# Patient Record
Sex: FEMALE | Race: WHITE | NOT HISPANIC OR LATINO
[De-identification: names, ages, dates, MRNs, and addresses within clinical notes are randomized per-mention and may not be internally consistent; named-entity substitution may affect disease eponyms.]

---

## 2019-06-28 ENCOUNTER — TRANSCRIPTION ENCOUNTER (OUTPATIENT)
Age: 29
End: 2019-06-28

## 2019-06-28 ENCOUNTER — EMERGENCY (EMERGENCY)
Facility: HOSPITAL | Age: 29
LOS: 1 days | End: 2019-06-28
Admitting: EMERGENCY MEDICINE
Payer: COMMERCIAL

## 2019-06-28 PROCEDURE — 73080 X-RAY EXAM OF ELBOW: CPT | Mod: 26,RT

## 2019-06-28 PROCEDURE — 73090 X-RAY EXAM OF FOREARM: CPT | Mod: 26,RT,76

## 2019-06-28 PROCEDURE — 99285 EMERGENCY DEPT VISIT HI MDM: CPT

## 2019-06-29 ENCOUNTER — INPATIENT (INPATIENT)
Facility: HOSPITAL | Age: 29
LOS: 3 days | Discharge: HOME | DRG: 494 | End: 2019-07-03
Attending: SURGERY | Admitting: SURGERY
Payer: COMMERCIAL

## 2019-06-29 VITALS
SYSTOLIC BLOOD PRESSURE: 110 MMHG | DIASTOLIC BLOOD PRESSURE: 76 MMHG | HEART RATE: 95 BPM | TEMPERATURE: 99 F | RESPIRATION RATE: 14 BRPM | OXYGEN SATURATION: 98 %

## 2019-06-29 DIAGNOSIS — S51.859A: ICD-10-CM

## 2019-06-29 DIAGNOSIS — T07.XXXA UNSPECIFIED MULTIPLE INJURIES, INITIAL ENCOUNTER: ICD-10-CM

## 2019-06-29 DIAGNOSIS — S42.494B: ICD-10-CM

## 2019-06-29 DIAGNOSIS — Z98.891 HISTORY OF UTERINE SCAR FROM PREVIOUS SURGERY: Chronic | ICD-10-CM

## 2019-06-29 PROBLEM — Z00.00 ENCOUNTER FOR PREVENTIVE HEALTH EXAMINATION: Status: ACTIVE | Noted: 2019-06-29

## 2019-06-29 LAB
ALBUMIN SERPL ELPH-MCNC: 4.5 G/DL — SIGNIFICANT CHANGE UP (ref 3.3–5.2)
ALP SERPL-CCNC: 60 U/L — SIGNIFICANT CHANGE UP (ref 40–120)
ALT FLD-CCNC: 15 U/L — SIGNIFICANT CHANGE UP
ANION GAP SERPL CALC-SCNC: 12 MMOL/L — SIGNIFICANT CHANGE UP (ref 5–17)
ANION GAP SERPL CALC-SCNC: 15 MMOL/L — SIGNIFICANT CHANGE UP (ref 5–17)
APPEARANCE UR: CLEAR — SIGNIFICANT CHANGE UP
APPEARANCE UR: CLEAR — SIGNIFICANT CHANGE UP
APTT BLD: 27.3 SEC — LOW (ref 27.5–36.3)
AST SERPL-CCNC: 30 U/L — SIGNIFICANT CHANGE UP
BACTERIA # UR AUTO: ABNORMAL
BASOPHILS # BLD AUTO: 0 K/UL — SIGNIFICANT CHANGE UP (ref 0–0.2)
BASOPHILS # BLD AUTO: 0 K/UL — SIGNIFICANT CHANGE UP (ref 0–0.2)
BASOPHILS NFR BLD AUTO: 0 % — SIGNIFICANT CHANGE UP (ref 0–2)
BASOPHILS NFR BLD AUTO: 0 % — SIGNIFICANT CHANGE UP (ref 0–2)
BILIRUB SERPL-MCNC: 0.3 MG/DL — LOW (ref 0.4–2)
BILIRUB UR-MCNC: NEGATIVE — SIGNIFICANT CHANGE UP
BILIRUB UR-MCNC: NEGATIVE — SIGNIFICANT CHANGE UP
BLD GP AB SCN SERPL QL: SIGNIFICANT CHANGE UP
BUN SERPL-MCNC: 16 MG/DL — SIGNIFICANT CHANGE UP (ref 8–20)
BUN SERPL-MCNC: 24 MG/DL — HIGH (ref 8–20)
CALCIUM SERPL-MCNC: 8.9 MG/DL — SIGNIFICANT CHANGE UP (ref 8.6–10.2)
CALCIUM SERPL-MCNC: 9 MG/DL — SIGNIFICANT CHANGE UP (ref 8.6–10.2)
CHLORIDE SERPL-SCNC: 105 MMOL/L — SIGNIFICANT CHANGE UP (ref 98–107)
CHLORIDE SERPL-SCNC: 106 MMOL/L — SIGNIFICANT CHANGE UP (ref 98–107)
CO2 SERPL-SCNC: 18 MMOL/L — LOW (ref 22–29)
CO2 SERPL-SCNC: 20 MMOL/L — LOW (ref 22–29)
COLOR SPEC: YELLOW — SIGNIFICANT CHANGE UP
COLOR SPEC: YELLOW — SIGNIFICANT CHANGE UP
CREAT SERPL-MCNC: 0.68 MG/DL — SIGNIFICANT CHANGE UP (ref 0.5–1.3)
CREAT SERPL-MCNC: 0.95 MG/DL — SIGNIFICANT CHANGE UP (ref 0.5–1.3)
DIFF PNL FLD: ABNORMAL
DIFF PNL FLD: NEGATIVE — SIGNIFICANT CHANGE UP
EOSINOPHIL # BLD AUTO: 0 K/UL — SIGNIFICANT CHANGE UP (ref 0–0.5)
EOSINOPHIL # BLD AUTO: 0.24 K/UL — SIGNIFICANT CHANGE UP (ref 0–0.5)
EOSINOPHIL NFR BLD AUTO: 0 % — SIGNIFICANT CHANGE UP (ref 0–6)
EOSINOPHIL NFR BLD AUTO: 0.9 % — SIGNIFICANT CHANGE UP (ref 0–6)
EPI CELLS # UR: SIGNIFICANT CHANGE UP
EPI CELLS # UR: SIGNIFICANT CHANGE UP
ETHANOL SERPL-MCNC: <10 MG/DL — SIGNIFICANT CHANGE UP
GIANT PLATELETS BLD QL SMEAR: PRESENT — SIGNIFICANT CHANGE UP
GIANT PLATELETS BLD QL SMEAR: PRESENT — SIGNIFICANT CHANGE UP
GLUCOSE SERPL-MCNC: 131 MG/DL — HIGH (ref 70–115)
GLUCOSE SERPL-MCNC: 136 MG/DL — HIGH (ref 70–115)
GLUCOSE UR QL: 50 MG/DL
GLUCOSE UR QL: 50 MG/DL
HCG SERPL-ACNC: <4 MIU/ML — SIGNIFICANT CHANGE UP
HCG UR QL: NEGATIVE — SIGNIFICANT CHANGE UP
HCT VFR BLD CALC: 29.9 % — LOW (ref 34.5–45)
HCT VFR BLD CALC: 36.1 % — SIGNIFICANT CHANGE UP (ref 34.5–45)
HGB BLD-MCNC: 11.3 G/DL — LOW (ref 11.5–15.5)
HGB BLD-MCNC: 9.4 G/DL — LOW (ref 11.5–15.5)
INR BLD: 1.15 RATIO — SIGNIFICANT CHANGE UP (ref 0.88–1.16)
KETONES UR-MCNC: ABNORMAL
KETONES UR-MCNC: NEGATIVE — SIGNIFICANT CHANGE UP
LEUKOCYTE ESTERASE UR-ACNC: NEGATIVE — SIGNIFICANT CHANGE UP
LEUKOCYTE ESTERASE UR-ACNC: NEGATIVE — SIGNIFICANT CHANGE UP
LIDOCAIN IGE QN: 24 U/L — SIGNIFICANT CHANGE UP (ref 22–51)
LYMPHOCYTES # BLD AUTO: 0.48 K/UL — LOW (ref 1–3.3)
LYMPHOCYTES # BLD AUTO: 1.36 K/UL — SIGNIFICANT CHANGE UP (ref 1–3.3)
LYMPHOCYTES # BLD AUTO: 2.6 % — LOW (ref 13–44)
LYMPHOCYTES # BLD AUTO: 5.2 % — LOW (ref 13–44)
MAGNESIUM SERPL-MCNC: 1.7 MG/DL — SIGNIFICANT CHANGE UP (ref 1.6–2.6)
MANUAL SMEAR VERIFICATION: SIGNIFICANT CHANGE UP
MANUAL SMEAR VERIFICATION: SIGNIFICANT CHANGE UP
MCHC RBC-ENTMCNC: 27.3 PG — SIGNIFICANT CHANGE UP (ref 27–34)
MCHC RBC-ENTMCNC: 27.5 PG — SIGNIFICANT CHANGE UP (ref 27–34)
MCHC RBC-ENTMCNC: 31.3 GM/DL — LOW (ref 32–36)
MCHC RBC-ENTMCNC: 31.4 GM/DL — LOW (ref 32–36)
MCV RBC AUTO: 86.9 FL — SIGNIFICANT CHANGE UP (ref 80–100)
MCV RBC AUTO: 87.8 FL — SIGNIFICANT CHANGE UP (ref 80–100)
MONOCYTES # BLD AUTO: 0.68 K/UL — SIGNIFICANT CHANGE UP (ref 0–0.9)
MONOCYTES # BLD AUTO: 1.3 K/UL — HIGH (ref 0–0.9)
MONOCYTES NFR BLD AUTO: 2.6 % — SIGNIFICANT CHANGE UP (ref 2–14)
MONOCYTES NFR BLD AUTO: 7 % — SIGNIFICANT CHANGE UP (ref 2–14)
MYELOCYTES NFR BLD: 0.9 % — HIGH (ref 0–0)
NEUTROPHILS # BLD AUTO: 16.75 K/UL — HIGH (ref 1.8–7.4)
NEUTROPHILS # BLD AUTO: 23.66 K/UL — HIGH (ref 1.8–7.4)
NEUTROPHILS NFR BLD AUTO: 88.7 % — HIGH (ref 43–77)
NEUTROPHILS NFR BLD AUTO: 90.4 % — HIGH (ref 43–77)
NEUTS BAND # BLD: 1.7 % — SIGNIFICANT CHANGE UP (ref 0–8)
NITRITE UR-MCNC: NEGATIVE — SIGNIFICANT CHANGE UP
NITRITE UR-MCNC: NEGATIVE — SIGNIFICANT CHANGE UP
PH UR: 6 — SIGNIFICANT CHANGE UP (ref 5–8)
PH UR: 7 — SIGNIFICANT CHANGE UP (ref 5–8)
PHOSPHATE SERPL-MCNC: 3 MG/DL — SIGNIFICANT CHANGE UP (ref 2.4–4.7)
PLAT MORPH BLD: NORMAL — SIGNIFICANT CHANGE UP
PLAT MORPH BLD: NORMAL — SIGNIFICANT CHANGE UP
PLATELET # BLD AUTO: 178 K/UL — SIGNIFICANT CHANGE UP (ref 150–400)
PLATELET # BLD AUTO: 258 K/UL — SIGNIFICANT CHANGE UP (ref 150–400)
PLATELET COUNT - ESTIMATE: ABNORMAL
POTASSIUM SERPL-MCNC: 3.7 MMOL/L — SIGNIFICANT CHANGE UP (ref 3.5–5.3)
POTASSIUM SERPL-MCNC: 3.7 MMOL/L — SIGNIFICANT CHANGE UP (ref 3.5–5.3)
POTASSIUM SERPL-SCNC: 3.7 MMOL/L — SIGNIFICANT CHANGE UP (ref 3.5–5.3)
POTASSIUM SERPL-SCNC: 3.7 MMOL/L — SIGNIFICANT CHANGE UP (ref 3.5–5.3)
PROT SERPL-MCNC: 7.7 G/DL — SIGNIFICANT CHANGE UP (ref 6.6–8.7)
PROT UR-MCNC: 15 MG/DL
PROT UR-MCNC: NEGATIVE MG/DL — SIGNIFICANT CHANGE UP
PROTHROM AB SERPL-ACNC: 13.3 SEC — HIGH (ref 10–12.9)
RBC # BLD: 3.44 M/UL — LOW (ref 3.8–5.2)
RBC # BLD: 4.11 M/UL — SIGNIFICANT CHANGE UP (ref 3.8–5.2)
RBC # FLD: 14.3 % — SIGNIFICANT CHANGE UP (ref 10.3–14.5)
RBC # FLD: 14.4 % — SIGNIFICANT CHANGE UP (ref 10.3–14.5)
RBC BLD AUTO: NORMAL — SIGNIFICANT CHANGE UP
RBC BLD AUTO: NORMAL — SIGNIFICANT CHANGE UP
RBC CASTS # UR COMP ASSIST: ABNORMAL /HPF (ref 0–4)
RBC CASTS # UR COMP ASSIST: SIGNIFICANT CHANGE UP /HPF (ref 0–4)
SODIUM SERPL-SCNC: 137 MMOL/L — SIGNIFICANT CHANGE UP (ref 135–145)
SODIUM SERPL-SCNC: 139 MMOL/L — SIGNIFICANT CHANGE UP (ref 135–145)
SP GR SPEC: 1.01 — SIGNIFICANT CHANGE UP (ref 1.01–1.02)
SP GR SPEC: 1.01 — SIGNIFICANT CHANGE UP (ref 1.01–1.02)
TYPE + AB SCN PNL BLD: SIGNIFICANT CHANGE UP
UROBILINOGEN FLD QL: NEGATIVE MG/DL — SIGNIFICANT CHANGE UP
UROBILINOGEN FLD QL: NEGATIVE MG/DL — SIGNIFICANT CHANGE UP
WBC # BLD: 18.53 K/UL — HIGH (ref 3.8–10.5)
WBC # BLD: 26.17 K/UL — HIGH (ref 3.8–10.5)
WBC # FLD AUTO: 18.53 K/UL — HIGH (ref 3.8–10.5)
WBC # FLD AUTO: 26.17 K/UL — HIGH (ref 3.8–10.5)
WBC UR QL: SIGNIFICANT CHANGE UP
WBC UR QL: SIGNIFICANT CHANGE UP

## 2019-06-29 PROCEDURE — 24000 ARTHRT ELBW EXPL DRG/RMVL FB: CPT | Mod: AS,RT

## 2019-06-29 PROCEDURE — 71045 X-RAY EXAM CHEST 1 VIEW: CPT | Mod: 26

## 2019-06-29 PROCEDURE — 73060 X-RAY EXAM OF HUMERUS: CPT | Mod: 26,RT

## 2019-06-29 PROCEDURE — 73130 X-RAY EXAM OF HAND: CPT | Mod: 26,RT

## 2019-06-29 PROCEDURE — 24000 ARTHRT ELBW EXPL DRG/RMVL FB: CPT | Mod: RT

## 2019-06-29 PROCEDURE — 73206 CT ANGIO UPR EXTRM W/O&W/DYE: CPT | Mod: 26,RT,76

## 2019-06-29 PROCEDURE — 73080 X-RAY EXAM OF ELBOW: CPT | Mod: 26,RT

## 2019-06-29 PROCEDURE — 11010 DEBRIDE SKIN AT FX SITE: CPT | Mod: 59

## 2019-06-29 PROCEDURE — 73090 X-RAY EXAM OF FOREARM: CPT | Mod: 26,LT

## 2019-06-29 PROCEDURE — 99222 1ST HOSP IP/OBS MODERATE 55: CPT

## 2019-06-29 PROCEDURE — 99285 EMERGENCY DEPT VISIT HI MDM: CPT

## 2019-06-29 RX ORDER — AMPICILLIN SODIUM AND SULBACTAM SODIUM 250; 125 MG/ML; MG/ML
3 INJECTION, POWDER, FOR SUSPENSION INTRAMUSCULAR; INTRAVENOUS EVERY 6 HOURS
Refills: 0 | Status: DISCONTINUED | OUTPATIENT
Start: 2019-06-29 | End: 2019-07-03

## 2019-06-29 RX ORDER — SODIUM CHLORIDE 9 MG/ML
1000 INJECTION, SOLUTION INTRAVENOUS ONCE
Refills: 0 | Status: DISCONTINUED | OUTPATIENT
Start: 2019-06-29 | End: 2019-06-29

## 2019-06-29 RX ORDER — HYDROMORPHONE HYDROCHLORIDE 2 MG/ML
0.5 INJECTION INTRAMUSCULAR; INTRAVENOUS; SUBCUTANEOUS
Refills: 0 | Status: DISCONTINUED | OUTPATIENT
Start: 2019-06-29 | End: 2019-06-29

## 2019-06-29 RX ORDER — HYDROMORPHONE HYDROCHLORIDE 2 MG/ML
0.5 INJECTION INTRAMUSCULAR; INTRAVENOUS; SUBCUTANEOUS EVERY 4 HOURS
Refills: 0 | Status: DISCONTINUED | OUTPATIENT
Start: 2019-06-29 | End: 2019-06-30

## 2019-06-29 RX ORDER — SODIUM CHLORIDE 9 MG/ML
1000 INJECTION, SOLUTION INTRAVENOUS
Refills: 0 | Status: DISCONTINUED | OUTPATIENT
Start: 2019-06-29 | End: 2019-06-29

## 2019-06-29 RX ORDER — AMPICILLIN SODIUM AND SULBACTAM SODIUM 250; 125 MG/ML; MG/ML
INJECTION, POWDER, FOR SUSPENSION INTRAMUSCULAR; INTRAVENOUS
Refills: 0 | Status: DISCONTINUED | OUTPATIENT
Start: 2019-06-29 | End: 2019-07-03

## 2019-06-29 RX ORDER — ONDANSETRON 8 MG/1
4 TABLET, FILM COATED ORAL ONCE
Refills: 0 | Status: DISCONTINUED | OUTPATIENT
Start: 2019-06-29 | End: 2019-06-29

## 2019-06-29 RX ORDER — FENTANYL CITRATE 50 UG/ML
50 INJECTION INTRAVENOUS ONCE
Refills: 0 | Status: DISCONTINUED | OUTPATIENT
Start: 2019-06-29 | End: 2019-06-29

## 2019-06-29 RX ORDER — AMPICILLIN SODIUM AND SULBACTAM SODIUM 250; 125 MG/ML; MG/ML
3 INJECTION, POWDER, FOR SUSPENSION INTRAMUSCULAR; INTRAVENOUS ONCE
Refills: 0 | Status: COMPLETED | OUTPATIENT
Start: 2019-06-29 | End: 2019-06-29

## 2019-06-29 RX ORDER — ACETAMINOPHEN 500 MG
650 TABLET ORAL EVERY 6 HOURS
Refills: 0 | Status: DISCONTINUED | OUTPATIENT
Start: 2019-06-29 | End: 2019-07-03

## 2019-06-29 RX ORDER — SODIUM CHLORIDE 9 MG/ML
1000 INJECTION, SOLUTION INTRAVENOUS
Refills: 0 | Status: DISCONTINUED | OUTPATIENT
Start: 2019-06-29 | End: 2019-06-30

## 2019-06-29 RX ORDER — HYDROMORPHONE HYDROCHLORIDE 2 MG/ML
1 INJECTION INTRAMUSCULAR; INTRAVENOUS; SUBCUTANEOUS EVERY 4 HOURS
Refills: 0 | Status: DISCONTINUED | OUTPATIENT
Start: 2019-06-29 | End: 2019-06-30

## 2019-06-29 RX ORDER — POTASSIUM CHLORIDE 20 MEQ
10 PACKET (EA) ORAL
Refills: 0 | Status: COMPLETED | OUTPATIENT
Start: 2019-06-29 | End: 2019-06-29

## 2019-06-29 RX ORDER — MAGNESIUM SULFATE 500 MG/ML
2 VIAL (ML) INJECTION ONCE
Refills: 0 | Status: COMPLETED | OUTPATIENT
Start: 2019-06-29 | End: 2019-06-29

## 2019-06-29 RX ADMIN — Medication 50 GRAM(S): at 10:55

## 2019-06-29 RX ADMIN — Medication 1 MILLIGRAM(S): at 02:06

## 2019-06-29 RX ADMIN — HYDROMORPHONE HYDROCHLORIDE 1 MILLIGRAM(S): 2 INJECTION INTRAMUSCULAR; INTRAVENOUS; SUBCUTANEOUS at 02:06

## 2019-06-29 RX ADMIN — HYDROMORPHONE HYDROCHLORIDE 0.5 MILLIGRAM(S): 2 INJECTION INTRAMUSCULAR; INTRAVENOUS; SUBCUTANEOUS at 15:42

## 2019-06-29 RX ADMIN — AMPICILLIN SODIUM AND SULBACTAM SODIUM 200 GRAM(S): 250; 125 INJECTION, POWDER, FOR SUSPENSION INTRAMUSCULAR; INTRAVENOUS at 13:27

## 2019-06-29 RX ADMIN — Medication 650 MILLIGRAM(S): at 21:40

## 2019-06-29 RX ADMIN — AMPICILLIN SODIUM AND SULBACTAM SODIUM 200 GRAM(S): 250; 125 INJECTION, POWDER, FOR SUSPENSION INTRAMUSCULAR; INTRAVENOUS at 17:36

## 2019-06-29 RX ADMIN — Medication 100 MILLIEQUIVALENT(S): at 11:32

## 2019-06-29 RX ADMIN — Medication 650 MILLIGRAM(S): at 15:22

## 2019-06-29 RX ADMIN — HYDROMORPHONE HYDROCHLORIDE 0.5 MILLIGRAM(S): 2 INJECTION INTRAMUSCULAR; INTRAVENOUS; SUBCUTANEOUS at 16:12

## 2019-06-29 RX ADMIN — Medication 100 MILLIEQUIVALENT(S): at 15:52

## 2019-06-29 RX ADMIN — Medication 650 MILLIGRAM(S): at 20:52

## 2019-06-29 RX ADMIN — AMPICILLIN SODIUM AND SULBACTAM SODIUM 200 GRAM(S): 250; 125 INJECTION, POWDER, FOR SUSPENSION INTRAMUSCULAR; INTRAVENOUS at 04:10

## 2019-06-29 RX ADMIN — SODIUM CHLORIDE 100 MILLILITER(S): 9 INJECTION, SOLUTION INTRAVENOUS at 17:36

## 2019-06-29 RX ADMIN — Medication 650 MILLIGRAM(S): at 14:52

## 2019-06-29 RX ADMIN — Medication 100 MILLIEQUIVALENT(S): at 14:50

## 2019-06-29 RX ADMIN — FENTANYL CITRATE 50 MICROGRAM(S): 50 INJECTION INTRAVENOUS at 00:40

## 2019-06-29 NOTE — H&P ADULT - ATTENDING COMMENTS
Agree with above assessment.  The patient was seen and examined.  The patient was seen and examined by me. The patient was attacked and bitten several times to the left and right arm by a pit bull that was going to attack her son.  The patient states that the right arm took the brunt of the attack and hurts more than the left. She denies LOC, chest or abdominal pain.  HEENT NC/AT PERRL EOMI no raccoon eyes, no zepeda signs, trachea midline, no gross tenderness, no JVD, chest B/L air entry, abdomen is soft, non tender, no guarding, no rebound, pelvis is without crepitus and non tender, the right and left upper extremities are with multiple puncture wounds, there is a larger laceration noted at the lateral aspect of the right elbow.  Distal pulses are grossly intact by all four extremities. X-ray reveals air in the right elbow joint with a distal humerus fracture. Patient is to be admitted to the trauma service, ortho consult for fracture and wound management.  Pain control, IV antibiotics, trauma cleared for OR for ortho intervention.

## 2019-06-29 NOTE — PROGRESS NOTE ADULT - SUBJECTIVE AND OBJECTIVE BOX
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

## 2019-06-29 NOTE — ED PROVIDER NOTE - SKIN, MLM
3 cm laceration to distal RUE. multiple scattered circumferential abrasions to GREG upper extremities

## 2019-06-29 NOTE — H&P ADULT - ASSESSMENT
30yo F s/p bite wound from dog with possible distal humeral and proximal radial fractures on right side.  No reported LUE fractures.  B/L arm lacerations, with Right deeper, larger and worse than left.  Pt tachycardic, H/H WNL, no acute blood loss, other vitals stable, afebrile, Primary intact.  R distal median nerve lesion suspected.    -admit to trauma under Dr. Irwin  -f/u CTA upper extremities  -f/u imaging of RUE  -ortho on board for washout of wounds  -NPO/IVF and preop for surgery with ortho in AM  -f/u [facility] imaging upload  -pain control and anxiety control  -f/u after washout with tertiary and final reads

## 2019-06-29 NOTE — ED PROVIDER NOTE - MUSCULOSKELETAL, MLM
RUE in sling, RUE tender to palpation, no midline tenderness to palpation, able to move GREG lower extremities spontaneously

## 2019-06-29 NOTE — H&P ADULT - HISTORY OF PRESENT ILLNESS
30yo F s/p dog bite on b/l RUE [place] [facility] [place].  Pt saw dog running after her son.  Pt pushed son away and took the bite from the dog.  Dog kept shaking aggressively when attacking patient on the right arm.  Pt also was attacked on the L arm.  Pt's friend came and kicked the dog away from patient.  At [facility] [place], pt received tetanus, zosyn, and percocet.  Wound was not washed out.  Pt reportedly had open fracture in RUE, but no final read was reported.  CD available.  No sob/cp/lightheadedness/dizziness.  Does have right hand weakness and sensory loss. 30yo F s/p dog bite on b/l RUE transfer from [facility] [place], trauma B activation.  Pt saw dog running after her son.  Pt pushed son away and took the bite from the dog.  Dog kept shaking aggressively when attacking patient on the right arm.  Pt also was attacked on the L arm.  Pt's friend came and kicked the dog away from patient.  At [facility] [place], pt received tetanus, zosyn, and percocet.  Wound was not washed out.  Pt reportedly had open fracture in RUE, but no final read was reported.  CD available.  No sob/cp/lightheadedness/dizziness.  Does have right hand weakness and sensory loss.  B/L RUE swelling.      Primary Survey:  A: Protected, patient conversating  B: CTAB. Symmetrical chest rise  C: 2+ central (femoral) & peripheral pulses (Radial, DP)  D: GCS 15, MAEO, interacting. No andrew disability noted  E: B/L RUE circumferential lacerations proximal to elbows, with R worse than L in depth of lacerations; right knee abrasion    Vitals:  Temp: 99  HR: 142 BP: 130/74 RR: 18  SpO2: 99%RA    CXR: Negative for evidence of hemo/pneumothorax 30yo F s/p dog bite on b/l RUE transfer from [facility] [place], trauma B activation.  Pt saw dog running after her son.  Pt pushed son away and took the bite from the dog.  Dog kept shaking aggressively when attacking patient on the right arm.  Pt also was attacked on the L arm.  Pt's friend came and kicked the dog away from patient.  At [facility] [place], pt received tetanus, zosyn, and percocet.  Wound was not washed out.  Pt reportedly had open fracture in RUE, but no final read was reported.  CD available.  No sob/cp/lightheadedness/dizziness.  Does have right hand weakness and sensory loss.  B/L RUE swelling.  Dog reportedly up to date on shots.    Primary Survey:  A: Protected, patient conversating  B: CTAB. Symmetrical chest rise  C: 2+ central (femoral) & peripheral pulses (Radial, DP)  D: GCS 15, MAEO, interacting. No andrew disability noted  E: B/L RUE circumferential lacerations proximal to elbows, with R worse than L in depth of lacerations; right knee abrasion    Vitals:  Temp: 99  HR: 142 BP: 130/74 RR: 18  SpO2: 99%RA    CXR: Negative for evidence of hemo/pneumothorax

## 2019-06-29 NOTE — ED PROVIDER NOTE - CARE PLAN
Principal Discharge DX:	Dog bite of forearm, unspecified laterality, initial encounter  Secondary Diagnosis:	Open fracture of forearm

## 2019-06-29 NOTE — H&P ADULT - NSHPPHYSICALEXAM_GEN_ALL_CORE
Constitutional: Well-developed well nourished Female in no acute distress  HEENT: Head is normocephalic and atraumatic, maxillofacial structures stable, no blood or discharge from nares or oral cavity, no zepeda sign / racoon eyes, EOMI b/l, pupils 2 mm round and reactive to light b/l, no active drainage or redness  Neck: cervical collar in place, trachea midline  Respiratory: Breath sounds CTA b/l respirations are unlabored, no accessory muscle use, no conversational dyspnea  Cardiovascular: Regular rate & rhythm, +S1, S2, Chest wall is non-tender to palpation, no subQ emphysema or crepitus palpated  Gastrointestinal: Abdomen soft, non-tender, non-distended, no rebound tenderness / guarding, no ecchymosis or external signs of abdominal trauma  Musculoskeletal: moving b/l LEs and LUE spontaneously, RUE tenderness and pain with medial three fingers weakness, and sensory loss, consistent with median nerve distribution of lesion; LUE mild tenderness at bite marks with swelling; comparments soft b/l UE's; 7 circumferential lacerations on distal part of right upper brachium, averaging about 4cm in length; 6 circumferential lacerations on distal part of left upper brachium, less deep than right with 1 cm length lacerations.  Pelvis: stable  Vascular: 2+ radial, femoral, and DP pulses b/l  Neurological: GCS: 15 (4/5/6). A&O x 3; no gross sensory / motor / coordination deficits  Musculoskeletal: 5/5 strength of lower extremities b/l; 0/5 strength on medial three fingers of right hand, able to move wrist, RUE 4/5 strength throughout, due to pain  Neuropsinal: no C/T/LS spine tenderness to palpation, no step-offs or signs of external trauma to the back

## 2019-06-29 NOTE — CONSULT NOTE ADULT - SUBJECTIVE AND OBJECTIVE BOX
Pt Name: MARIA DOLORES GILL    MRN: [id number]      Patient is a 29 year old right hand dominant Female presenting to [facility] ED status post dog bite on bilateral upper extremities [place] [facility] [place]. Patient states she saw her dog running after her son and feared he was going to be bitten, so she pushed her son out of the way. Subsequently the dog latched on to her right arm causing multiple deep puncture wounds near her elbow. Patient was also bitten on left forearm. Patients boyfriend intervened to break the patient free from the dog. Patient received tetanus and zosyn at [facility] [place]. No washout of wound was performed. CT and plain films performed in ED revealed right distal humerus fracture with questionable proximal radius fracture with air in elbow joint. Patient reports difficulty moving her right hand/fingers with associated sensory loss to 4th and 5th fingers. Moderate pain in left forearm- no associated fractures evident on CT or plain films performed at [facility] [place]. No other orthopedic complaints.       REVIEW OF SYSTEMS    General: Alert, responsive, in NAD    Respiratory and Thorax: No difficulty breathing. No cough.    Gastrointestinal:	 No abdominal pain. No diarrhea.     Musculoskeletal: SEE HPI.    Endocrine: No Hx of diabetes.    ROS is otherwise negative.    PAST MEDICAL & SURGICAL HISTORY:  PAST MEDICAL & SURGICAL HISTORY:  No pertinent past medical history  H/O  section      Allergies: No Known Allergies    Medications: acetaminophen   Tablet .. 650 milliGRAM(s) Oral every 6 hours  ampicillin/sulbactam  IVPB      ampicillin/sulbactam  IVPB 3 Gram(s) IV Intermittent once  ampicillin/sulbactam  IVPB 3 Gram(s) IV Intermittent every 6 hours  HYDROmorphone  Injectable 1 milliGRAM(s) IV Push every 4 hours PRN  HYDROmorphone  Injectable 0.5 milliGRAM(s) IV Push every 4 hours PRN  lactated ringers. 1000 milliLiter(s) IV Continuous <Continuous>  LORazepam     Tablet 1 milliGRAM(s) Oral once      FAMILY HISTORY:  : non-contributory    Social History: Patient works as a  full time.     Ambulation: Walking independently                           11.3   26.17 )-----------( 258      ( 2019 00:45 )             36.1       -    139  |  106  |  24.0<H>  ----------------------------<  131<H>  3.7   |  18.0<L>  |  0.95    Ca    9.0      2019 00:45    TPro  7.7  /  Alb  4.5  /  TBili  0.3<L>  /  DBili  x   /  AST  30  /  ALT  15  /  AlkPhos  60  06-      Vital Signs Last 24 Hrs  T(C): 37 (2019 03:01), Max: 37 (2019 03:01)  T(F): 98.6 (2019 03:01), Max: 98.6 (2019 03:01)  HR: 95 (2019 03:01) (95 - 95)  BP: 110/76 (2019 03:01) (110/76 - 110/76)  BP(mean): --  RR: 14 (2019 03:) (14 - 14)  SpO2: 98% (:) (98% - 98%)    Daily     Daily       PHYSICAL EXAM:      Appearance: Alert, responsive, in obvious discomfort, lying in ED stretcher.     Neurological: Sensation is grossly intact to light touch. 5/5 motor function of all extremities. No focal deficits or weaknesses found.    Skin: no rash on visible skin. Skin is clean, dry and intact. No bleeding. No abrasions. No ulcerations.    Vascular: 2+ distal pulses. Cap refill < 2 sec. No signs of venous insufficiency or stasis. No extremity ulcerations. No cyanosis.    Musculoskeletal:         Left Upper Extremity: Numerous circumferential puncture/bite marks circumferentially on distal aspect of left upper brachium (more superficial than those on right extremity. Mild TTP throughout forearm. Sensation intact in median/ulnar/radial distribution. 5/5 EE, 5/5 WF/WE. +finger flexion/extension digits 1-5. Radial pulse 2+. BCR.        Right Upper Extremity: Numerous puncture/bite marks circumferentially, ranging in length from 1-6cm, with the Largest wound on distal part of right upper brachium proximal to elbow with subcutaneous fat visible. RUE tenderness and swelling noted. Compartments soft throughout. Sensation diminished to medial three fingers. Sensation to 4th and 5th fingers intact.  4/5 WF/WE. Not able to flex/extend medial three digits. 0/5 strength of medial three fingers. Radial pulse 2+. Capillary refill is less than 2 seconds.        Left Lower Extremity: Normal painless range of motion. No bony tenderness. No crepitus.        Right Lower Extremity: Small superficial abrasion over knee noted. Normal painless range of motion. No bony tenderness. No crepitus.       Imaging Studies:      < from: CT Angio Upper Extremity w/ IV Cont, Bilateral (19 @ 01:17) >     EXAM:  CT ANGIO UPR EXT (W)AW IC BI                          PROCEDURE DATE:  2019          INTERPRETATION:  CLINICAL INFORMATION: TRAUMA ALERT. Status post dog bite.    TECHNIQUE: CT angiogram of bilateral upper extremities were performed   with coronal and sagittal reformats. 88 cc of Optiray 350 was   administered and 12 cc was discarded. Axial MIP reformats were   additionally performed.    COMPARISON: No similar prior studies are available for comparison.    FINDINGS:    Bone: An acute, comminuted fracture of the medial epicondyle of the right   elbow is seen with mild displacement of multiple fracture fragments. No   additional fracture or dislocation is demonstrated.    Vascular: The right subclavian, axillary, brachial, radial and ulnar   arteries are patent. The left subclavian and axillary arteries are   patent. Venous contamination limits evaluation of the remainder of the   left upper extremity arteries.    Soft tissues: Multiple skin defects are seen in the right elbow   corresponding to lacerations. Moderate subcutaneous inflammatory change   is seen in the right elbow more so posteriorly extending into the upper   arm and forearm which is posttraumatic in nature versus a cellulitis.   Multiple foci of subcutaneous gas is noted in the right elbow with   extension into the right upper arm and forearm. Mild circumferential   subcutaneous inflammatory change is seen in the left forearm which is   post traumatic in nature versus a cellulitis. Associated scattered foci   of subcutaneous gas is noted in the left forearm likely introduced via a   laceration. No rim-enhancing subcutaneous fluid collection is seen to   suggest an abscess.    IMPRESSION:    1. Acute, comminuted and displaced fracture of the medialepicondyle of   the right elbow.  2. Moderate subcutaneous inflammatory change in the right elbow, more so   posteriorly, extending into the upper arm and forearm which is   posttraumatic in nature versus a cellulitis. Associated foci of   subcutaneous gas, more so in the right elbow, likely introduced via the   lacerations. No associated rim-enhancing fluid collection to suggest an   abscess.  3. Mild circumferential subcutaneous inflammatory change in the left   forearm which is post traumatic in nature versus a cellulitis. Associated   foci of subcutaneous gas likely introduced via a laceration. No   associated rim-enhancing fluid collection to suggest an abscess.    < end of copied text >    A/P:  Pt is a  29y Female s/o multiple dog bites of Right/Left upper extremity with associated open comminuted medial epicondyle fracture of right elbow as described above    PLAN:   -Case discussed with Dr. Baires  -Wounds of RUE and LUE copiously washed out with betadine and NS  -Posterior splint applied to RUE  -NPO/IVF and preop for OR at 730 today for washout of Right elbow  -Pain control  -NWB of RUE  -Continue rest of care as per primary team    SPLINTING   PROCEDURE NOTE: Splinting    Performed by: Naga Parry PA-C     Indication: open Right distal humerus fracture     The right upper extremity was appropriately positioned. A plaster splint was applied. Distally, sensation to the medial three fingers was diminished-unchanged from prior to splint being applied. The patient tolerated the procedure well. Pt Name: MARIA DOLORES GILL    MRN: [id number]      Patient is a 29 year old right hand dominant Female presenting to [facility] ED status post dog bite on bilateral upper extremities transferred from [facility] [place]. Patient states she saw her dog running after her son and feared he was going to be bitten, so she pushed her son out of the way. Subsequently the dog latched on to her right arm causing multiple deep puncture wounds near her elbow. Patient was also bitten on left forearm. Patients boyfriend intervened to break the patient free from the dog. Patient received tetanus and zosyn at [facility] [place]. No washout of wound was performed. CT and plain films performed in ED revealed right distal humerus fracture with questionable proximal radius fracture with air in elbow joint. Patient reports difficulty moving her right hand/fingers with associated sensory loss to 4th and 5th fingers. Moderate pain in left forearm- no associated fractures evident on CT or plain films performed at [facility] [place]. No other orthopedic complaints.       REVIEW OF SYSTEMS    General: Alert, responsive, in NAD    Respiratory and Thorax: No difficulty breathing. No cough.    Gastrointestinal:	 No abdominal pain. No diarrhea.     Musculoskeletal: SEE HPI.    Endocrine: No Hx of diabetes.    ROS is otherwise negative.    PAST MEDICAL & SURGICAL HISTORY:  PAST MEDICAL & SURGICAL HISTORY:  No pertinent past medical history  H/O  section      Allergies: No Known Allergies    Medications: acetaminophen   Tablet .. 650 milliGRAM(s) Oral every 6 hours  ampicillin/sulbactam  IVPB      ampicillin/sulbactam  IVPB 3 Gram(s) IV Intermittent once  ampicillin/sulbactam  IVPB 3 Gram(s) IV Intermittent every 6 hours  HYDROmorphone  Injectable 1 milliGRAM(s) IV Push every 4 hours PRN  HYDROmorphone  Injectable 0.5 milliGRAM(s) IV Push every 4 hours PRN  lactated ringers. 1000 milliLiter(s) IV Continuous <Continuous>  LORazepam     Tablet 1 milliGRAM(s) Oral once      FAMILY HISTORY:  : non-contributory    Social History: Patient works as a  full time.     Ambulation: Walking independently                           11.3   26.17 )-----------( 258      ( 2019 00:45 )             36.1       -    139  |  106  |  24.0<H>  ----------------------------<  131<H>  3.7   |  18.0<L>  |  0.95    Ca    9.0      2019 00:45    TPro  7.7  /  Alb  4.5  /  TBili  0.3<L>  /  DBili  x   /  AST  30  /  ALT  15  /  AlkPhos  60  06-      Vital Signs Last 24 Hrs  T(C): 37 (2019 03:01), Max: 37 (2019 03:01)  T(F): 98.6 (2019 03:01), Max: 98.6 (2019 03:01)  HR: 95 (2019 03:01) (95 - 95)  BP: 110/76 (2019 03:01) (110/76 - 110/76)  BP(mean): --  RR: 14 (2019 03:) (14 - 14)  SpO2: 98% (:) (98% - 98%)    Daily     Daily       PHYSICAL EXAM:      Appearance: Alert, responsive, in obvious discomfort, lying in ED stretcher.     Neurological: Sensation is grossly intact to light touch. 5/5 motor function of all extremities. No focal deficits or weaknesses found.    Skin: no rash on visible skin. Skin is clean, dry and intact. No bleeding. No abrasions. No ulcerations.    Vascular: 2+ distal pulses. Cap refill < 2 sec. No signs of venous insufficiency or stasis. No extremity ulcerations. No cyanosis.    Musculoskeletal:         Left Upper Extremity: Numerous circumferential puncture/bite marks circumferentially on distal aspect of left upper brachium (more superficial than those on right extremity. Mild TTP throughout forearm. Sensation intact in median/ulnar/radial distribution. 5/5 EE, 5/5 WF/WE. +finger flexion/extension digits 1-5. Radial pulse 2+. BCR.        Right Upper Extremity: Numerous puncture/bite marks circumferentially, ranging in length from 1-6cm, with the Largest wound on distal part of right upper brachium proximal to elbow with subcutaneous fat visible. RUE tenderness and swelling noted. Compartments soft throughout. Sensation diminished to digtits 1-3 fingers. Sensation to 4th and 5th fingers intact.  4/5 WF/WE. Not able to flex/extend digits 1-3 digits. 0/5 strength of digits 1-3 fingers. Radial pulse 2+. Capillary refill is less than 2 seconds.        Left Lower Extremity: Normal painless range of motion. No bony tenderness. No crepitus.        Right Lower Extremity: Small superficial abrasion over knee noted. Normal painless range of motion. No bony tenderness. No crepitus.       Imaging Studies:      < from: CT Angio Upper Extremity w/ IV Cont, Bilateral (19 @ 01:17) >     EXAM:  CT ANGIO UPR EXT (W)AW IC BI                          PROCEDURE DATE:  2019          INTERPRETATION:  CLINICAL INFORMATION: TRAUMA ALERT. Status post dog bite.    TECHNIQUE: CT angiogram of bilateral upper extremities were performed   with coronal and sagittal reformats. 88 cc of Optiray 350 was   administered and 12 cc was discarded. Axial MIP reformats were   additionally performed.    COMPARISON: No similar prior studies are available for comparison.    FINDINGS:    Bone: An acute, comminuted fracture of the medial epicondyle of the right   elbow is seen with mild displacement of multiple fracture fragments. No   additional fracture or dislocation is demonstrated.    Vascular: The right subclavian, axillary, brachial, radial and ulnar   arteries are patent. The left subclavian and axillary arteries are   patent. Venous contamination limits evaluation of the remainder of the   left upper extremity arteries.    Soft tissues: Multiple skin defects are seen in the right elbow   corresponding to lacerations. Moderate subcutaneous inflammatory change   is seen in the right elbow more so posteriorly extending into the upper   arm and forearm which is posttraumatic in nature versus a cellulitis.   Multiple foci of subcutaneous gas is noted in the right elbow with   extension into the right upper arm and forearm. Mild circumferential   subcutaneous inflammatory change is seen in the left forearm which is   post traumatic in nature versus a cellulitis. Associated scattered foci   of subcutaneous gas is noted in the left forearm likely introduced via a   laceration. No rim-enhancing subcutaneous fluid collection is seen to   suggest an abscess.    IMPRESSION:    1. Acute, comminuted and displaced fracture of the medialepicondyle of   the right elbow.  2. Moderate subcutaneous inflammatory change in the right elbow, more so   posteriorly, extending into the upper arm and forearm which is   posttraumatic in nature versus a cellulitis. Associated foci of   subcutaneous gas, more so in the right elbow, likely introduced via the   lacerations. No associated rim-enhancing fluid collection to suggest an   abscess.  3. Mild circumferential subcutaneous inflammatory change in the left   forearm which is post traumatic in nature versus a cellulitis. Associated   foci of subcutaneous gas likely introduced via a laceration. No   associated rim-enhancing fluid collection to suggest an abscess.    < end of copied text >    A/P:  Pt is a  29y Female s/o multiple dog bites of Right/Left upper extremity with associated open comminuted medial epicondyle fracture of right elbow as described above    PLAN:   -Case discussed with Dr. Baires  -Wounds of RUE and LUE copiously washed out with betadine and NS  -Posterior splint applied to RUE  -NPO/IVF and preop for OR at 730 today for washout of Right elbow  -Pain control  -NWB of RUE  -Continue rest of care as per primary team    SPLINTING   PROCEDURE NOTE: Splinting    Performed by: Naga Parry PA-C     Indication: open Right distal humerus fracture     The right upper extremity was appropriately positioned. A plaster splint was applied. Distally, sensation to the digits 1-3 was diminished-unchanged from prior to splint being applied. The patient tolerated the procedure well. Pt Name: MARIA DOLORES GILL    MRN: [id number]      Patient is a 29 year old right hand dominant Female presenting to [facility] ED status post dog bite on bilateral upper extremities [place] from [facility] [place]. Patient states she saw her dog running after her son and feared he was going to be bitten, so she pushed her son out of the way. Subsequently the dog latched on to her right arm causing multiple deep puncture wounds near her elbow. Patient was also bitten on left forearm. Patients boyfriend intervened to break the patient free from the dog. Patient received tetanus and zosyn at [facility] [place]. No washout of wound was performed. CT and plain films performed in ED revealed right distal humerus fracture with questionable proximal radius fracture with air in elbow joint. Patient reports difficulty moving her right hand/fingers with associated sensory loss to 1-3 fingers. Moderate pain in left forearm- no associated fractures evident on CT or plain films performed at [facility] [place]. No other orthopedic complaints.       REVIEW OF SYSTEMS    General: Alert, responsive, in NAD    Respiratory and Thorax: No difficulty breathing. No cough.    Gastrointestinal:	 No abdominal pain. No diarrhea.     Musculoskeletal: SEE HPI.    Endocrine: No Hx of diabetes.    ROS is otherwise negative.    PAST MEDICAL & SURGICAL HISTORY:  PAST MEDICAL & SURGICAL HISTORY:  No pertinent past medical history  H/O  section      Allergies: No Known Allergies    Medications: acetaminophen   Tablet .. 650 milliGRAM(s) Oral every 6 hours  ampicillin/sulbactam  IVPB      ampicillin/sulbactam  IVPB 3 Gram(s) IV Intermittent once  ampicillin/sulbactam  IVPB 3 Gram(s) IV Intermittent every 6 hours  HYDROmorphone  Injectable 1 milliGRAM(s) IV Push every 4 hours PRN  HYDROmorphone  Injectable 0.5 milliGRAM(s) IV Push every 4 hours PRN  lactated ringers. 1000 milliLiter(s) IV Continuous <Continuous>  LORazepam     Tablet 1 milliGRAM(s) Oral once      FAMILY HISTORY:  : non-contributory    Social History: Patient works as a  full time.     Ambulation: Walking independently                           11.3   26.17 )-----------( 258      ( 2019 00:45 )             36.1       -    139  |  106  |  24.0<H>  ----------------------------<  131<H>  3.7   |  18.0<L>  |  0.95    Ca    9.0      2019 00:45    TPro  7.7  /  Alb  4.5  /  TBili  0.3<L>  /  DBili  x   /  AST  30  /  ALT  15  /  AlkPhos  60  06-      Vital Signs Last 24 Hrs  T(C): 37 (2019 03:01), Max: 37 (2019 03:01)  T(F): 98.6 (2019 03:01), Max: 98.6 (2019 03:01)  HR: 95 (2019 03:01) (95 - 95)  BP: 110/76 (2019 03:01) (110/76 - 110/76)  BP(mean): --  RR: 14 (2019 03:) (14 - 14)  SpO2: 98% (:) (98% - 98%)    Daily     Daily       PHYSICAL EXAM:      Appearance: Alert, responsive, in obvious discomfort, lying in ED stretcher.     Neurological: Sensation is grossly intact to light touch. 5/5 motor function of all extremities. No focal deficits or weaknesses found.    Skin: no rash on visible skin. Skin is clean, dry and intact. No bleeding. No abrasions. No ulcerations.    Vascular: 2+ distal pulses. Cap refill < 2 sec. No signs of venous insufficiency or stasis. No extremity ulcerations. No cyanosis.    Musculoskeletal:         Left Upper Extremity: Numerous circumferential puncture/bite marks circumferentially on distal aspect of left upper brachium (more superficial than those on right extremity. Mild TTP throughout forearm. Sensation intact in median/ulnar/radial distribution. 5/5 EE, 5/5 WF/WE. +finger flexion/extension digits 1-5. Radial pulse 2+. BCR.        Right Upper Extremity: Numerous puncture/bite marks circumferentially, ranging in length from 1-6cm, with the Largest wound on distal part of right upper brachium proximal to elbow with subcutaneous fat visible. RUE tenderness and swelling noted. Compartments soft throughout. Sensation diminished to digtits 1-3 fingers. Sensation to 4th and 5th fingers intact.  4/5 WF/WE. Not able to flex/extend digits 1-3 digits. 0/5 strength of digits 1-3 fingers. Radial pulse 2+. Capillary refill is less than 2 seconds.        Left Lower Extremity: Normal painless range of motion. No bony tenderness. No crepitus.        Right Lower Extremity: Small superficial abrasion over knee noted. Normal painless range of motion. No bony tenderness. No crepitus.       Imaging Studies:      < from: CT Angio Upper Extremity w/ IV Cont, Bilateral (19 @ 01:17) >     EXAM:  CT ANGIO UPR EXT (W)AW IC BI                          PROCEDURE DATE:  2019          INTERPRETATION:  CLINICAL INFORMATION: TRAUMA ALERT. Status post dog bite.    TECHNIQUE: CT angiogram of bilateral upper extremities were performed   with coronal and sagittal reformats. 88 cc of Optiray 350 was   administered and 12 cc was discarded. Axial MIP reformats were   additionally performed.    COMPARISON: No similar prior studies are available for comparison.    FINDINGS:    Bone: An acute, comminuted fracture of the medial epicondyle of the right   elbow is seen with mild displacement of multiple fracture fragments. No   additional fracture or dislocation is demonstrated.    Vascular: The right subclavian, axillary, brachial, radial and ulnar   arteries are patent. The left subclavian and axillary arteries are   patent. Venous contamination limits evaluation of the remainder of the   left upper extremity arteries.    Soft tissues: Multiple skin defects are seen in the right elbow   corresponding to lacerations. Moderate subcutaneous inflammatory change   is seen in the right elbow more so posteriorly extending into the upper   arm and forearm which is posttraumatic in nature versus a cellulitis.   Multiple foci of subcutaneous gas is noted in the right elbow with   extension into the right upper arm and forearm. Mild circumferential   subcutaneous inflammatory change is seen in the left forearm which is   post traumatic in nature versus a cellulitis. Associated scattered foci   of subcutaneous gas is noted in the left forearm likely introduced via a   laceration. No rim-enhancing subcutaneous fluid collection is seen to   suggest an abscess.    IMPRESSION:    1. Acute, comminuted and displaced fracture of the medialepicondyle of   the right elbow.  2. Moderate subcutaneous inflammatory change in the right elbow, more so   posteriorly, extending into the upper arm and forearm which is   posttraumatic in nature versus a cellulitis. Associated foci of   subcutaneous gas, more so in the right elbow, likely introduced via the   lacerations. No associated rim-enhancing fluid collection to suggest an   abscess.  3. Mild circumferential subcutaneous inflammatory change in the left   forearm which is post traumatic in nature versus a cellulitis. Associated   foci of subcutaneous gas likely introduced via a laceration. No   associated rim-enhancing fluid collection to suggest an abscess.    < end of copied text >    A/P:  Pt is a  29y Female s/o multiple dog bites of Right/Left upper extremity with associated open comminuted medial epicondyle fracture of right elbow as described above    PLAN:   -Case discussed with Dr. Baires  -Wounds of RUE and LUE copiously washed out with betadine and NS  -Posterior splint applied to RUE  -NPO/IVF and preop for OR at 730 today for washout of Right elbow  -Pain control  -NWB of RUE  -Continue rest of care as per primary team    SPLINTING   PROCEDURE NOTE: Splinting    Performed by: Naga Parry PA-C     Indication: open Right distal humerus fracture     The right upper extremity was appropriately positioned. A plaster splint was applied. Distally, sensation to the digits 1-3 was diminished-unchanged from prior to splint being applied. The patient tolerated the procedure well. Pt Name: MARIA DOLORES GILL    MRN: [id number]      Patient is a 29 year old right hand dominant Female presenting to [facility] ED status post dog bite on bilateral upper extremities [place] [facility] [place] Patient states she saw her dog running after her son and feared he was going to be bitten, so she pushed her son out of the way. Subsequently the dog latched on to her right arm causing multiple deep puncture wounds near her elbow. Patient was also bitten on left forearm. Patient's boyfriend intervened to break the patient free from the dog. Patient received tetanus [place] [facility] washout of wound was performed. CT and plain films performed in ED revealed right distal humerus fracture with questionable proximal radius fracture with air in elbow joint. Patient reports difficulty moving her right hand/fingers with associated sensory loss to thumb, index, and middle fingers. Moderate pain in left forearm- no associated fractures evident on CT or plain [place] [facility] No other orthopedic complaints.       REVIEW OF SYSTEMS    General: Alert, responsive, in NAD    Respiratory and Thorax: No difficulty breathing. No cough.    Gastrointestinal:	 No abdominal pain. No diarrhea.     Musculoskeletal: SEE HPI.    Endocrine: No Hx of diabetes.    ROS is otherwise negative.    PAST MEDICAL & SURGICAL HISTORY:  PAST MEDICAL & SURGICAL HISTORY:  No pertinent past medical history  H/O  section      Allergies: No Known Allergies    Medications: acetaminophen   Tablet .. 650 milliGRAM(s) Oral every 6 hours  ampicillin/sulbactam  IVPB      ampicillin/sulbactam  IVPB 3 Gram(s) IV Intermittent once  ampicillin/sulbactam  IVPB 3 Gram(s) IV Intermittent every 6 hours  HYDROmorphone  Injectable 1 milliGRAM(s) IV Push every 4 hours PRN  HYDROmorphone  Injectable 0.5 milliGRAM(s) IV Push every 4 hours PRN  lactated ringers. 1000 milliLiter(s) IV Continuous <Continuous>  LORazepam     Tablet 1 milliGRAM(s) Oral once      FAMILY HISTORY:  : non-contributory    Social History: Patient works as a  full time.     Ambulation: Walking independently                           11.3   26.17 )-----------( 258      ( 2019 00:45 )             36.1       06-    139  |  106  |  24.0<H>  ----------------------------<  131<H>  3.7   |  18.0<L>  |  0.95    Ca    9.0      2019 00:45    TPro  7.7  /  Alb  4.5  /  TBili  0.3<L>  /  DBili  x   /  AST  30  /  ALT  15  /  AlkPhos  60  06      Vital Signs Last 24 Hrs  T(C): 37 (2019 03:01), Max: 37 (2019 03:01)  T(F): 98.6 (2019 03:01), Max: 98.6 (2019 03:01)  HR: 95 (2019 03:) (95 - 95)  BP: 110/76 (:01) (110/76 - 110/76)  BP(mean): --  RR: 14 (2019 03:01) (14 - 14)  SpO2: 98% (2019 03:) (98% - 98%)    Daily     Daily       PHYSICAL EXAM:      Appearance: Alert, responsive, in obvious discomfort, lying in ED stretcher.     Neurological: Sensation is grossly intact to light touch. 5/5 motor function of all extremities. No focal deficits or weaknesses found.    Skin: no rash on visible skin. Skin is clean, dry and intact. No bleeding. No abrasions. No ulcerations.    Vascular: 2+ distal pulses. Cap refill < 2 sec. No signs of venous insufficiency or stasis. No extremity ulcerations. No cyanosis.    Musculoskeletal:         Left Upper Extremity: Numerous circumferential puncture/bite marks circumferentially on distal aspect of left upper brachium (more superficial than those on right extremity. Mild TTP throughout forearm. Sensation intact in median/ulnar/radial distribution. 5/5 EE, 5/5 WF/WE. +finger flexion/extension digits 1-5. Radial pulse 2+. BCR.        Right Upper Extremity: Numerous puncture/bite marks circumferentially, ranging in length from 1-6cm, with the Largest wound on distal part of right upper brachium proximal to elbow with subcutaneous fat visible. RUE tenderness and swelling noted. Compartments soft throughout. Sensation diminished to digtits 1-3 fingers. Sensation to 4th and 5th fingers intact.  4/5 WF/WE. Not able to flex/extend digits 1-3 digits. 0/5 strength of digits 1-3 fingers. Radial pulse 2+. Capillary refill is less than 2 seconds.        Left Lower Extremity: Normal painless range of motion. No bony tenderness. No crepitus.        Right Lower Extremity: Small superficial abrasion over knee noted. Normal painless range of motion. No bony tenderness. No crepitus.       Imaging Studies:      < from: CT Angio Upper Extremity w/ IV Cont, Bilateral (19 @ 01:17) >     EXAM:  CT ANGIO UPR EXT (W)AW IC BI                          PROCEDURE DATE:  2019          INTERPRETATION:  CLINICAL INFORMATION: TRAUMA ALERT. Status post dog bite.    TECHNIQUE: CT angiogram of bilateral upper extremities were performed   with coronal and sagittal reformats. 88 cc of Optiray 350 was   administered and 12 cc was discarded. Axial MIP reformats were   additionally performed.    COMPARISON: No similar prior studies are available for comparison.    FINDINGS:    Bone: An acute, comminuted fracture of the medial epicondyle of the right   elbow is seen with mild displacement of multiple fracture fragments. No   additional fracture or dislocation is demonstrated.    Vascular: The right subclavian, axillary, brachial, radial and ulnar   arteries are patent. The left subclavian and axillary arteries are   patent. Venous contamination limits evaluation of the remainder of the   left upper extremity arteries.    Soft tissues: Multiple skin defects are seen in the right elbow   corresponding to lacerations. Moderate subcutaneous inflammatory change   is seen in the right elbow more so posteriorly extending into the upper   arm and forearm which is posttraumatic in nature versus a cellulitis.   Multiple foci of subcutaneous gas is noted in the right elbow with   extension into the right upper arm and forearm. Mild circumferential   subcutaneous inflammatory change is seen in the left forearm which is   post traumatic in nature versus a cellulitis. Associated scattered foci   of subcutaneous gas is noted in the left forearm likely introduced via a   laceration. No rim-enhancing subcutaneous fluid collection is seen to   suggest an abscess.    IMPRESSION:    1. Acute, comminuted and displaced fracture of the medialepicondyle of   the right elbow.  2. Moderate subcutaneous inflammatory change in the right elbow, more so   posteriorly, extending into the upper arm and forearm which is   posttraumatic in nature versus a cellulitis. Associated foci of   subcutaneous gas, more so in the right elbow, likely introduced via the   lacerations. No associated rim-enhancing fluid collection to suggest an   abscess.  3. Mild circumferential subcutaneous inflammatory change in the left   forearm which is post traumatic in nature versus a cellulitis. Associated   foci of subcutaneous gas likely introduced via a laceration. No   associated rim-enhancing fluid collection to suggest an abscess.    < end of copied text >    A/P:  Pt is a  29y Female s/o multiple dog bites of Right/Left upper extremity with associated open comminuted medial epicondyle fracture of right elbow as described above    PLAN:   -Case discussed with Dr. Baires  -Wounds of RUE and LUE copiously washed out with betadine and NS  -Posterior splint applied to RUE  -NPO/IVF and preop for OR at 730 today for washout of Right elbow  -Pain control  -NWB of RUE  -Continue rest of care as per primary team    SPLINTING   PROCEDURE NOTE: Splinting    Performed by: Naga Parry PA-C     Indication: open Right distal humerus fracture     The right upper extremity was appropriately positioned. A plaster splint was applied. Distally, sensation to the digits 1-3 was diminished-unchanged from prior to splint being applied. The patient tolerated the procedure well. Pt Name: MARIA DOLORES GILL    MRN: [id number]      Patient is a 29 year old right hand dominant Female presenting to [facility] ED status post dog bite on bilateral upper extremities transferred from [facility] [place]. Patient states she saw her dog running after her son and feared he was going to be bitten, so she pushed her son out of the way. Subsequently the dog latched on to her right arm causing multiple deep puncture wounds near her elbow. Patient was also bitten on left forearm. Patient's boyfriend intervened to break the patient free from the dog. Patient received tetanus and zosyn at [facility] [place]. No washout of wound was performed. CT and plain films performed in ED revealed right distal humerus fracture with questionable proximal radius fracture with air in elbow joint. Patient reports difficulty moving her right hand/fingers with associated sensory loss to thumb, index, and middle fingers and ulnar two digits. Moderate pain in left forearm- no associated fractures evident on CT or plain films performed at [facility] [place]. No other orthopedic complaints.       REVIEW OF SYSTEMS    General: Alert, responsive, in NAD    Respiratory and Thorax: No difficulty breathing. No cough.    Gastrointestinal:	 No abdominal pain. No diarrhea.     Musculoskeletal: SEE HPI.    Endocrine: No Hx of diabetes.    ROS is otherwise negative.    PAST MEDICAL & SURGICAL HISTORY:  PAST MEDICAL & SURGICAL HISTORY:  No pertinent past medical history  H/O  section      Allergies: No Known Allergies    Medications: acetaminophen   Tablet .. 650 milliGRAM(s) Oral every 6 hours  ampicillin/sulbactam  IVPB      ampicillin/sulbactam  IVPB 3 Gram(s) IV Intermittent once  ampicillin/sulbactam  IVPB 3 Gram(s) IV Intermittent every 6 hours  HYDROmorphone  Injectable 1 milliGRAM(s) IV Push every 4 hours PRN  HYDROmorphone  Injectable 0.5 milliGRAM(s) IV Push every 4 hours PRN  lactated ringers. 1000 milliLiter(s) IV Continuous <Continuous>  LORazepam     Tablet 1 milliGRAM(s) Oral once      FAMILY HISTORY:  : non-contributory    Social History: Patient works as a  full time.     Ambulation: Walking independently                           11.3   26.17 )-----------( 258      ( 2019 00:45 )             36.1       06-    139  |  106  |  24.0<H>  ----------------------------<  131<H>  3.7   |  18.0<L>  |  0.95    Ca    9.0      2019 00:45    TPro  7.7  /  Alb  4.5  /  TBili  0.3<L>  /  DBili  x   /  AST  30  /  ALT  15  /  AlkPhos  60  06-      Vital Signs Last 24 Hrs  T(C): 37 (2019 03:01), Max: 37 (2019 03:01)  T(F): 98.6 (2019 03:01), Max: 98.6 (2019 03:01)  HR: 95 (2019 03:) (95 - 95)  BP: 110/76 (:01) (110/76 - 110/76)  BP(mean): --  RR: 14 (2019 03:01) (14 - 14)  SpO2: 98% (2019 03:) (98% - 98%)    Daily     Daily       PHYSICAL EXAM:      Appearance: Alert, responsive, in obvious discomfort, lying in ED stretcher.     Neurological: Sensation is grossly intact to light touch. 5/5 motor function of all extremities. No focal deficits or weaknesses found.    Skin: no rash on visible skin. Skin is clean, dry and intact. No bleeding. No abrasions. No ulcerations.    Vascular: 2+ distal pulses. Cap refill < 2 sec. No signs of venous insufficiency or stasis. No extremity ulcerations. No cyanosis.    Musculoskeletal:         Left Upper Extremity: Numerous circumferential puncture/bite marks circumferentially on distal aspect of left upper brachium (more superficial than those on right extremity. Mild TTP throughout forearm. Sensation intact in median/ulnar/radial distribution. 5/5 EE, 5/5 WF/WE. +finger flexion/extension digits 1-5. Radial pulse 2+. BCR.        Right Upper Extremity: Numerous puncture/bite marks circumferentially, ranging in length from 1-6cm, with the Largest wound on distal part of right upper brachium proximal to elbow with subcutaneous fat visible. RUE tenderness and swelling noted. Compartments soft throughout. Sensation diminished to dorsal aspect digtits 1-3 fingers. Sensation to 4th and 5th fingers diminished diffusly.  4/5 WF/  1/5WE. Not able to flex/extend digits 1-5 digits. . Radial pulse 2+. Capillary refill is less than 2 seconds.        Left Lower Extremity: Normal painless range of motion. No bony tenderness. No crepitus.        Right Lower Extremity: Small superficial abrasion over knee noted. Normal painless range of motion. No bony tenderness. No crepitus.       Imaging Studies:      < from: CT Angio Upper Extremity w/ IV Cont, Bilateral (19 @ 01:17) >     EXAM:  CT ANGIO UPR EXT (W)AW IC BI                          PROCEDURE DATE:  2019          INTERPRETATION:  CLINICAL INFORMATION: TRAUMA ALERT. Status post dog bite.    TECHNIQUE: CT angiogram of bilateral upper extremities were performed   with coronal and sagittal reformats. 88 cc of Optiray 350 was   administered and 12 cc was discarded. Axial MIP reformats were   additionally performed.    COMPARISON: No similar prior studies are available for comparison.    FINDINGS:    Bone: An acute, comminuted fracture of the medial epicondyle of the right   elbow is seen with mild displacement of multiple fracture fragments. No   additional fracture or dislocation is demonstrated.    Vascular: The right subclavian, axillary, brachial, radial and ulnar   arteries are patent. The left subclavian and axillary arteries are   patent. Venous contamination limits evaluation of the remainder of the   left upper extremity arteries.    Soft tissues: Multiple skin defects are seen in the right elbow   corresponding to lacerations. Moderate subcutaneous inflammatory change   is seen in the right elbow more so posteriorly extending into the upper   arm and forearm which is posttraumatic in nature versus a cellulitis.   Multiple foci of subcutaneous gas is noted in the right elbow with   extension into the right upper arm and forearm. Mild circumferential   subcutaneous inflammatory change is seen in the left forearm which is   post traumatic in nature versus a cellulitis. Associated scattered foci   of subcutaneous gas is noted in the left forearm likely introduced via a   laceration. No rim-enhancing subcutaneous fluid collection is seen to   suggest an abscess.    IMPRESSION:    1. Acute, comminuted and displaced fracture of the medialepicondyle of   the right elbow.  2. Moderate subcutaneous inflammatory change in the right elbow, more so   posteriorly, extending into the upper arm and forearm which is   posttraumatic in nature versus a cellulitis. Associated foci of   subcutaneous gas, more so in the right elbow, likely introduced via the   lacerations. No associated rim-enhancing fluid collection to suggest an   abscess.  3. Mild circumferential subcutaneous inflammatory change in the left   forearm which is post traumatic in nature versus a cellulitis. Associated   foci of subcutaneous gas likely introduced via a laceration. No   associated rim-enhancing fluid collection to suggest an abscess.    < end of copied text >    A/P:  Pt is a  29y Female s/o multiple dog bites of Right/Left upper extremity with associated open comminuted medial epicondyle fracture of right elbow as described above and possible radial and ulnar nerve palsy/Laceration    PLAN:   -Case discussed with Dr. Baires  -Wounds of RUE and LUE copiously washed out with betadine and NS  -Posterior splint applied to RUE  -NPO/IVF and preop for OR at 730 today for washout of Right elbow and all open wounds.  Will need coordinated care for possible nerve exploration by our peripheral nerve surgeon and definitive fracture care by our traumatologist  -Pain control  -NWB of RUE  -Continue rest of care as per primary team    SPLINTING   PROCEDURE NOTE: Splinting    Performed by: Naga Parry PA-C     Indication: open Right distal humerus fracture     The right upper extremity was appropriately positioned. A plaster splint was applied. Distally, sensation to the digits 1-3 was diminished-unchanged from prior to splint being applied. The patient tolerated the procedure well.

## 2019-06-29 NOTE — ED PROVIDER NOTE - OBJECTIVE STATEMENT
28 y/o F pt [place] [facility] [place] s/p dog bite that occurred at 20:30 tonight. Pt states her boyfriend's 1 year old pit bull went to attack her son so she got in between them. Pt is R hand dominant. Pt received a Percocet and was given a Tetanus vaccine and Zosyn. Pt arrived with RUE in sling. Denies fever, chills, CP, SOB, abd pain, and n/v/d. No further complaints at this time.

## 2019-06-30 ENCOUNTER — TRANSCRIPTION ENCOUNTER (OUTPATIENT)
Age: 29
End: 2019-06-30

## 2019-06-30 LAB
ANION GAP SERPL CALC-SCNC: 12 MMOL/L — SIGNIFICANT CHANGE UP (ref 5–17)
BASOPHILS # BLD AUTO: 0.02 K/UL — SIGNIFICANT CHANGE UP (ref 0–0.2)
BASOPHILS NFR BLD AUTO: 0.2 % — SIGNIFICANT CHANGE UP (ref 0–2)
BUN SERPL-MCNC: 12 MG/DL — SIGNIFICANT CHANGE UP (ref 8–20)
CALCIUM SERPL-MCNC: 8.9 MG/DL — SIGNIFICANT CHANGE UP (ref 8.6–10.2)
CHLORIDE SERPL-SCNC: 107 MMOL/L — SIGNIFICANT CHANGE UP (ref 98–107)
CO2 SERPL-SCNC: 23 MMOL/L — SIGNIFICANT CHANGE UP (ref 22–29)
CREAT SERPL-MCNC: 0.88 MG/DL — SIGNIFICANT CHANGE UP (ref 0.5–1.3)
EOSINOPHIL # BLD AUTO: 0 K/UL — SIGNIFICANT CHANGE UP (ref 0–0.5)
EOSINOPHIL NFR BLD AUTO: 0 % — SIGNIFICANT CHANGE UP (ref 0–6)
GLUCOSE SERPL-MCNC: 96 MG/DL — SIGNIFICANT CHANGE UP (ref 70–115)
HCT VFR BLD CALC: 27.6 % — LOW (ref 34.5–45)
HGB BLD-MCNC: 8.6 G/DL — LOW (ref 11.5–15.5)
IMM GRANULOCYTES NFR BLD AUTO: 0.7 % — SIGNIFICANT CHANGE UP (ref 0–1.5)
LYMPHOCYTES # BLD AUTO: 1.41 K/UL — SIGNIFICANT CHANGE UP (ref 1–3.3)
LYMPHOCYTES # BLD AUTO: 12.2 % — LOW (ref 13–44)
MAGNESIUM SERPL-MCNC: 2.2 MG/DL — SIGNIFICANT CHANGE UP (ref 1.8–2.6)
MCHC RBC-ENTMCNC: 27.6 PG — SIGNIFICANT CHANGE UP (ref 27–34)
MCHC RBC-ENTMCNC: 31.2 GM/DL — LOW (ref 32–36)
MCV RBC AUTO: 88.5 FL — SIGNIFICANT CHANGE UP (ref 80–100)
MONOCYTES # BLD AUTO: 1.24 K/UL — HIGH (ref 0–0.9)
MONOCYTES NFR BLD AUTO: 10.7 % — SIGNIFICANT CHANGE UP (ref 2–14)
NEUTROPHILS # BLD AUTO: 8.8 K/UL — HIGH (ref 1.8–7.4)
NEUTROPHILS NFR BLD AUTO: 76.2 % — SIGNIFICANT CHANGE UP (ref 43–77)
PHOSPHATE SERPL-MCNC: 3 MG/DL — SIGNIFICANT CHANGE UP (ref 2.4–4.7)
PLATELET # BLD AUTO: 141 K/UL — LOW (ref 150–400)
POTASSIUM SERPL-MCNC: 3.9 MMOL/L — SIGNIFICANT CHANGE UP (ref 3.5–5.3)
POTASSIUM SERPL-SCNC: 3.9 MMOL/L — SIGNIFICANT CHANGE UP (ref 3.5–5.3)
RBC # BLD: 3.12 M/UL — LOW (ref 3.8–5.2)
RBC # FLD: 15.2 % — HIGH (ref 10.3–14.5)
SODIUM SERPL-SCNC: 142 MMOL/L — SIGNIFICANT CHANGE UP (ref 135–145)
WBC # BLD: 11.55 K/UL — HIGH (ref 3.8–10.5)
WBC # FLD AUTO: 11.55 K/UL — HIGH (ref 3.8–10.5)

## 2019-06-30 PROCEDURE — 99231 SBSQ HOSP IP/OBS SF/LOW 25: CPT

## 2019-06-30 RX ORDER — OXYCODONE HYDROCHLORIDE 5 MG/1
5 TABLET ORAL EVERY 4 HOURS
Refills: 0 | Status: DISCONTINUED | OUTPATIENT
Start: 2019-06-30 | End: 2019-07-03

## 2019-06-30 RX ORDER — OXYCODONE HYDROCHLORIDE 5 MG/1
10 TABLET ORAL EVERY 4 HOURS
Refills: 0 | Status: DISCONTINUED | OUTPATIENT
Start: 2019-06-30 | End: 2019-07-03

## 2019-06-30 RX ADMIN — AMPICILLIN SODIUM AND SULBACTAM SODIUM 200 GRAM(S): 250; 125 INJECTION, POWDER, FOR SUSPENSION INTRAMUSCULAR; INTRAVENOUS at 11:18

## 2019-06-30 RX ADMIN — AMPICILLIN SODIUM AND SULBACTAM SODIUM 200 GRAM(S): 250; 125 INJECTION, POWDER, FOR SUSPENSION INTRAMUSCULAR; INTRAVENOUS at 00:18

## 2019-06-30 RX ADMIN — HYDROMORPHONE HYDROCHLORIDE 1 MILLIGRAM(S): 2 INJECTION INTRAMUSCULAR; INTRAVENOUS; SUBCUTANEOUS at 02:16

## 2019-06-30 RX ADMIN — HYDROMORPHONE HYDROCHLORIDE 1 MILLIGRAM(S): 2 INJECTION INTRAMUSCULAR; INTRAVENOUS; SUBCUTANEOUS at 02:35

## 2019-06-30 RX ADMIN — Medication 650 MILLIGRAM(S): at 21:30

## 2019-06-30 RX ADMIN — OXYCODONE HYDROCHLORIDE 5 MILLIGRAM(S): 5 TABLET ORAL at 22:05

## 2019-06-30 RX ADMIN — Medication 650 MILLIGRAM(S): at 08:47

## 2019-06-30 RX ADMIN — AMPICILLIN SODIUM AND SULBACTAM SODIUM 200 GRAM(S): 250; 125 INJECTION, POWDER, FOR SUSPENSION INTRAMUSCULAR; INTRAVENOUS at 23:28

## 2019-06-30 RX ADMIN — Medication 650 MILLIGRAM(S): at 20:58

## 2019-06-30 RX ADMIN — SODIUM CHLORIDE 100 MILLILITER(S): 9 INJECTION, SOLUTION INTRAVENOUS at 07:48

## 2019-06-30 RX ADMIN — AMPICILLIN SODIUM AND SULBACTAM SODIUM 200 GRAM(S): 250; 125 INJECTION, POWDER, FOR SUSPENSION INTRAMUSCULAR; INTRAVENOUS at 05:33

## 2019-06-30 RX ADMIN — Medication 650 MILLIGRAM(S): at 14:58

## 2019-06-30 RX ADMIN — Medication 650 MILLIGRAM(S): at 07:47

## 2019-06-30 RX ADMIN — OXYCODONE HYDROCHLORIDE 5 MILLIGRAM(S): 5 TABLET ORAL at 22:30

## 2019-06-30 RX ADMIN — SODIUM CHLORIDE 100 MILLILITER(S): 9 INJECTION, SOLUTION INTRAVENOUS at 04:30

## 2019-06-30 RX ADMIN — Medication 650 MILLIGRAM(S): at 15:58

## 2019-06-30 RX ADMIN — AMPICILLIN SODIUM AND SULBACTAM SODIUM 200 GRAM(S): 250; 125 INJECTION, POWDER, FOR SUSPENSION INTRAMUSCULAR; INTRAVENOUS at 16:22

## 2019-06-30 NOTE — PROGRESS NOTE ADULT - SUBJECTIVE AND OBJECTIVE BOX
s/p washout right open distal humerus fx s/p dog bite pod #1.  Pt doing well, pain controlled.  numbness/tingling to right digits.  no sob, cp    Vital Signs Last 24 Hrs  T(C): 37 (30 Jun 2019 09:59), Max: 37 (30 Jun 2019 09:59)  T(F): 98.6 (30 Jun 2019 09:59), Max: 98.6 (30 Jun 2019 09:59)  HR: 104 (30 Jun 2019 09:59) (75 - 104)  BP: 134/80 (30 Jun 2019 09:59) (115/62 - 138/79)  BP(mean): --  RR: 19 (30 Jun 2019 09:59) (13 - 19)  SpO2: 99% (30 Jun 2019 09:59) (97% - 100%)    Pt lying in bed NAD  long arm splint in place to RUE  ROM to right wrist not tested secondary to splint  right thumb able to flex, no thumb extension  +flexion/extension at 2/3 digits with weakness  unable to extend 4/5 digits  sensation intact to 1/2/3 digits with diminished sensation to 4/5 digits  cap refill brisk    A/P:  plan for pt to go to OR Monday for ORIF distal humerus fx and nerve repair  npo after MN  hold anticoag  pain control

## 2019-06-30 NOTE — PROGRESS NOTE ADULT - ATTENDING COMMENTS
doing well clinically  cont with current care  on unasyn  planning for plastics OR on monday right arm , nerve exploration.

## 2019-07-01 DIAGNOSIS — S53.441A ULNAR COLLATERAL LIGAMENT SPRAIN OF RIGHT ELBOW, INITIAL ENCOUNTER: ICD-10-CM

## 2019-07-01 LAB
ABO RH CONFIRMATION: SIGNIFICANT CHANGE UP
ANION GAP SERPL CALC-SCNC: 11 MMOL/L — SIGNIFICANT CHANGE UP (ref 5–17)
APTT BLD: 29.5 SEC — SIGNIFICANT CHANGE UP (ref 27.5–36.3)
BASOPHILS # BLD AUTO: 0.04 K/UL — SIGNIFICANT CHANGE UP (ref 0–0.2)
BASOPHILS NFR BLD AUTO: 0.4 % — SIGNIFICANT CHANGE UP (ref 0–2)
BUN SERPL-MCNC: 14 MG/DL — SIGNIFICANT CHANGE UP (ref 8–20)
CALCIUM SERPL-MCNC: 9.2 MG/DL — SIGNIFICANT CHANGE UP (ref 8.6–10.2)
CHLORIDE SERPL-SCNC: 109 MMOL/L — HIGH (ref 98–107)
CO2 SERPL-SCNC: 24 MMOL/L — SIGNIFICANT CHANGE UP (ref 22–29)
CREAT SERPL-MCNC: 0.79 MG/DL — SIGNIFICANT CHANGE UP (ref 0.5–1.3)
EOSINOPHIL # BLD AUTO: 0.01 K/UL — SIGNIFICANT CHANGE UP (ref 0–0.5)
EOSINOPHIL NFR BLD AUTO: 0.1 % — SIGNIFICANT CHANGE UP (ref 0–6)
GLUCOSE SERPL-MCNC: 106 MG/DL — SIGNIFICANT CHANGE UP (ref 70–115)
HCT VFR BLD CALC: 28.7 % — LOW (ref 34.5–45)
HGB BLD-MCNC: 8.9 G/DL — LOW (ref 11.5–15.5)
IMM GRANULOCYTES NFR BLD AUTO: 0.4 % — SIGNIFICANT CHANGE UP (ref 0–1.5)
INR BLD: 1.23 RATIO — HIGH (ref 0.88–1.16)
LYMPHOCYTES # BLD AUTO: 1.73 K/UL — SIGNIFICANT CHANGE UP (ref 1–3.3)
LYMPHOCYTES # BLD AUTO: 17.4 % — SIGNIFICANT CHANGE UP (ref 13–44)
MAGNESIUM SERPL-MCNC: 1.9 MG/DL — SIGNIFICANT CHANGE UP (ref 1.6–2.6)
MCHC RBC-ENTMCNC: 27.7 PG — SIGNIFICANT CHANGE UP (ref 27–34)
MCHC RBC-ENTMCNC: 31 GM/DL — LOW (ref 32–36)
MCV RBC AUTO: 89.4 FL — SIGNIFICANT CHANGE UP (ref 80–100)
MONOCYTES # BLD AUTO: 0.78 K/UL — SIGNIFICANT CHANGE UP (ref 0–0.9)
MONOCYTES NFR BLD AUTO: 7.8 % — SIGNIFICANT CHANGE UP (ref 2–14)
NEUTROPHILS # BLD AUTO: 7.37 K/UL — SIGNIFICANT CHANGE UP (ref 1.8–7.4)
NEUTROPHILS NFR BLD AUTO: 73.9 % — SIGNIFICANT CHANGE UP (ref 43–77)
PHOSPHATE SERPL-MCNC: 3.7 MG/DL — SIGNIFICANT CHANGE UP (ref 2.4–4.7)
PLATELET # BLD AUTO: 151 K/UL — SIGNIFICANT CHANGE UP (ref 150–400)
POTASSIUM SERPL-MCNC: 3.8 MMOL/L — SIGNIFICANT CHANGE UP (ref 3.5–5.3)
POTASSIUM SERPL-SCNC: 3.8 MMOL/L — SIGNIFICANT CHANGE UP (ref 3.5–5.3)
PROTHROM AB SERPL-ACNC: 14.2 SEC — HIGH (ref 10–12.9)
RBC # BLD: 3.21 M/UL — LOW (ref 3.8–5.2)
RBC # FLD: 14.9 % — HIGH (ref 10.3–14.5)
SODIUM SERPL-SCNC: 144 MMOL/L — SIGNIFICANT CHANGE UP (ref 135–145)
WBC # BLD: 9.97 K/UL — SIGNIFICANT CHANGE UP (ref 3.8–10.5)
WBC # FLD AUTO: 9.97 K/UL — SIGNIFICANT CHANGE UP (ref 3.8–10.5)

## 2019-07-01 PROCEDURE — 73070 X-RAY EXAM OF ELBOW: CPT | Mod: 26,RT

## 2019-07-01 PROCEDURE — 99231 SBSQ HOSP IP/OBS SF/LOW 25: CPT

## 2019-07-01 PROCEDURE — 24575 OPTX HUMERAL EPCNDYLR FX: CPT | Mod: RT

## 2019-07-01 PROCEDURE — 11012 DEB SKIN BONE AT FX SITE: CPT | Mod: RT

## 2019-07-01 RX ORDER — FENTANYL CITRATE 50 UG/ML
25 INJECTION INTRAVENOUS
Refills: 0 | Status: DISCONTINUED | OUTPATIENT
Start: 2019-07-01 | End: 2019-07-01

## 2019-07-01 RX ORDER — HYDROMORPHONE HYDROCHLORIDE 2 MG/ML
1 INJECTION INTRAMUSCULAR; INTRAVENOUS; SUBCUTANEOUS
Refills: 0 | Status: DISCONTINUED | OUTPATIENT
Start: 2019-07-01 | End: 2019-07-01

## 2019-07-01 RX ORDER — SODIUM CHLORIDE 9 MG/ML
1000 INJECTION, SOLUTION INTRAVENOUS
Refills: 0 | Status: DISCONTINUED | OUTPATIENT
Start: 2019-07-01 | End: 2019-07-01

## 2019-07-01 RX ORDER — ONDANSETRON 8 MG/1
4 TABLET, FILM COATED ORAL ONCE
Refills: 0 | Status: DISCONTINUED | OUTPATIENT
Start: 2019-07-01 | End: 2019-07-01

## 2019-07-01 RX ORDER — DEXAMETHASONE 0.5 MG/5ML
8 ELIXIR ORAL ONCE
Refills: 0 | Status: DISCONTINUED | OUTPATIENT
Start: 2019-07-01 | End: 2019-07-01

## 2019-07-01 RX ORDER — CEFAZOLIN SODIUM 1 G
2000 VIAL (EA) INJECTION
Refills: 0 | Status: COMPLETED | OUTPATIENT
Start: 2019-07-01 | End: 2019-07-02

## 2019-07-01 RX ORDER — POTASSIUM CHLORIDE 20 MEQ
20 PACKET (EA) ORAL ONCE
Refills: 0 | Status: COMPLETED | OUTPATIENT
Start: 2019-07-01 | End: 2019-07-01

## 2019-07-01 RX ORDER — MAGNESIUM SULFATE 500 MG/ML
2 VIAL (ML) INJECTION ONCE
Refills: 0 | Status: COMPLETED | OUTPATIENT
Start: 2019-07-01 | End: 2019-07-01

## 2019-07-01 RX ADMIN — Medication 100 MILLIGRAM(S): at 16:58

## 2019-07-01 RX ADMIN — OXYCODONE HYDROCHLORIDE 5 MILLIGRAM(S): 5 TABLET ORAL at 05:45

## 2019-07-01 RX ADMIN — SODIUM CHLORIDE 125 MILLILITER(S): 9 INJECTION, SOLUTION INTRAVENOUS at 11:49

## 2019-07-01 RX ADMIN — HYDROMORPHONE HYDROCHLORIDE 1 MILLIGRAM(S): 2 INJECTION INTRAMUSCULAR; INTRAVENOUS; SUBCUTANEOUS at 11:47

## 2019-07-01 RX ADMIN — AMPICILLIN SODIUM AND SULBACTAM SODIUM 200 GRAM(S): 250; 125 INJECTION, POWDER, FOR SUSPENSION INTRAMUSCULAR; INTRAVENOUS at 17:39

## 2019-07-01 RX ADMIN — OXYCODONE HYDROCHLORIDE 5 MILLIGRAM(S): 5 TABLET ORAL at 21:45

## 2019-07-01 RX ADMIN — OXYCODONE HYDROCHLORIDE 5 MILLIGRAM(S): 5 TABLET ORAL at 06:22

## 2019-07-01 RX ADMIN — Medication 650 MILLIGRAM(S): at 16:57

## 2019-07-01 RX ADMIN — Medication 50 GRAM(S): at 05:45

## 2019-07-01 RX ADMIN — OXYCODONE HYDROCHLORIDE 5 MILLIGRAM(S): 5 TABLET ORAL at 22:30

## 2019-07-01 RX ADMIN — AMPICILLIN SODIUM AND SULBACTAM SODIUM 200 GRAM(S): 250; 125 INJECTION, POWDER, FOR SUSPENSION INTRAMUSCULAR; INTRAVENOUS at 05:45

## 2019-07-01 RX ADMIN — AMPICILLIN SODIUM AND SULBACTAM SODIUM 200 GRAM(S): 250; 125 INJECTION, POWDER, FOR SUSPENSION INTRAMUSCULAR; INTRAVENOUS at 12:13

## 2019-07-01 RX ADMIN — Medication 20 MILLIEQUIVALENT(S): at 05:45

## 2019-07-01 RX ADMIN — Medication 650 MILLIGRAM(S): at 17:49

## 2019-07-01 RX ADMIN — HYDROMORPHONE HYDROCHLORIDE 1 MILLIGRAM(S): 2 INJECTION INTRAMUSCULAR; INTRAVENOUS; SUBCUTANEOUS at 12:17

## 2019-07-01 NOTE — PROGRESS NOTE ADULT - SUBJECTIVE AND OBJECTIVE BOX
Orthopedic PA Postop Note  Patient S/P open reduction internal fixation R HUMERUS W/ radial/ulnar nerve decompression  Patient in bed comfortable   RIGHT UPPER EXTREMITY  Dressing C/D/I - ACE wrap without staining, splint of the RUE intact and in the appropriate positioning  Cap refill < 2 sec, fingers warm and normal in color  Palpable compartments soft and compressible  Motor exam limited due to splint application, +abduction/adduction of 1st digit, +flexion/extension/abduction/addiction of 2nd/3rd digits, decreased flexion/extension/adduction/abduction of the 4th/5th digits  Pt admits to decreased sensation of all digits, most notably in the 5th digit    Vital Signs Last 24 Hrs  T(C): 36.6 (01 Jul 2019 16:20), Max: 37.3 (01 Jul 2019 04:27)  T(F): 97.8 (01 Jul 2019 16:20), Max: 99.2 (01 Jul 2019 04:27)  HR: 88 (01 Jul 2019 16:20) (71 - 96)  BP: 115/77 (01 Jul 2019 16:20) (115/77 - 136/73)  BP(mean): --  RR: 17 (01 Jul 2019 16:20) (14 - 19)  SpO2: 97% (01 Jul 2019 16:20) (96% - 100%)    A/P: 29F S/P open reduction internal fixation R HUMERUS W/ radial/ulnar nerve decompression  1. Physical Therapy   2. Pain Control as clinically indicated

## 2019-07-01 NOTE — BRIEF OPERATIVE NOTE - NSICDXBRIEFPROCEDURE_GEN_ALL_CORE_FT
PROCEDURES:  Reconstruction, ligament, ulnar collateral, elbow 01-Jul-2019 13:48:09  Kennedy Moulton
PROCEDURES:  Neurolysis, peripheral 01-Jul-2019 11:24:37  Chintan Parks A

## 2019-07-01 NOTE — BRIEF OPERATIVE NOTE - OPERATION/FINDINGS
ulnar collateral ligament elbow, radial and ulnar nerves decompressed
Exploration of right upper extremity, decompression of ulnar nerve and radial nerve, internal neurolysis.

## 2019-07-01 NOTE — PROGRESS NOTE ADULT - SUBJECTIVE AND OBJECTIVE BOX
pt is s/p dog bite to right elbow with radial and ulnar nerve defecits.  Stable overnight, no changes.    AVSS    RUE incisions are c/d/i.  No erythema, mils serous drianage, no purulence.    Pt with paresthesias in ulanr nerve distribution.  FDI 4+/5.    ECRL in tact, PIN 0/5.      Plan for exploration, decompression of radial and ulanr nerves, possible nerve repair or reconstruction as needed, possible neurolysis.  Discussed possible permanent nerve disfunction, need for further surgeries.  ALl quesiotns answered and she agrees to proceed.

## 2019-07-01 NOTE — BRIEF OPERATIVE NOTE - NSICDXBRIEFPOSTOP_GEN_ALL_CORE_FT
POST-OP DIAGNOSIS:  Tear of ulnar collateral ligament of elbow 01-Jul-2019 13:48:49  Kennedy Moulton
POST-OP DIAGNOSIS:  Dog bite of arm 01-Jul-2019 11:25:16  Chintan Parks A

## 2019-07-01 NOTE — PROGRESS NOTE ADULT - SUBJECTIVE AND OBJECTIVE BOX
28 y/o F 4h postop for debridement, ulnar and radial nerve assess damage.   PC describes pain 10/10 on the right arm and nausea. No vomiting or fever.   On the PHE there is 2sec capillary refill NC, strength 4/5 on flexion for 1-4 fingers and 3/5 for 5 finger. Extension 2/5 for thumb, 3/5 for 2 and 3 and 2/5 for 4 and 5 fingers. Sensation decreased to tactile and pressure on the tip of the fingers.     A: 30y/o F 4h postop for ulnar and radial nerves damage assesment, in pain 10/10, with right hand strength and sensation improvement.     P: continue medication, double ATB, pain control and PT f/u. Dr. Parks consult in 1w postSx. 28 y/o F 4h postop for debridement, ulnar and radial nerve assess damage.   PC describes pain 10/10 on the right arm and nausea. No vomiting or fever.   On the PHE there is 2sec capillary refill with NC skin, strength 4/5 on flexion for fingers 1-4 and 3/5 for 5th finger. Extension 2/5 for thumb, 3/5 for 2 and 3 and 2/5 for 4 and 5 fingers. Sensation decreased to tactile and pressure on the tip of the fingers.   Piggy bags draining 50ml proximal and 100ml distal.    A: 28y/o F 4h postop for ulnar and radial nerves damage assessment in pain 10/10, with right hand strength and sensation improvement.     P: continue medication, double ATB (Ampicillin Sulbactam + Cefazolin), pain control and f/u PT. Dr. Parks consult in 1w postSx. Discharge on Augmentine for 7d. 28 y/o F 4h postop for debridement, ulnar and radial nerve assess damage.     PC describes pain 10/10 on the right arm and nausea. No vomiting, fever or chills.   On the PHE there is 2sec capillary refill with NC skin, strength 4/5 on flexion for fingers 1-4 and 3/5 for 5th finger. Extension 2/5 for thumb, 3/5 for 2 and 3 and 2/5 for 4 and 5 fingers. Sensation decreased to tactile and pressure on the tip of the fingers.   Piggy bags draining 50ml proximal and 100ml distal.

## 2019-07-01 NOTE — PROGRESS NOTE ADULT - SUBJECTIVE AND OBJECTIVE BOX
30yo F examined at bedside and found in no distress. No overnight events. No complaints. Denies fever, chills, nausea, vomiting, diarrhea, constipation, chest pain, and sob.     Vital Signs Last 24 Hrs  T(C): 36.8 (2019 00:18), Max: 37.2 (2019 19:14)  T(F): 98.3 (2019 00:18), Max: 99 (2019 19:14)  HR: 87 (2019 00:18) (80 - 104)  BP: 129/77 (2019 00:18) (120/81 - 134/80)  BP(mean): --  RR: 18 (2019 00:18) (18 - 19)  SpO2: 99% (2019 00:18) (98% - 99%)    Constitutional: patient resting comfortably in bed, in no acute distress  HEENT: EOMI / PERRLA, MMM  Respiratory: Non labored breathing  Cardiovascular: RRR  Gastrointestinal: Abdomen soft, non-tender, non-distended, no rebound tenderness / guarding  Msk: LUE w 4 puncture wounds w/o signs of infection covered w dressings and ACE wrap.     LABS:                        8.6    11.55 )-----------( 141      ( 2019 09:52 )             27.6     06-30    142  |  107  |  12.0  ----------------------------<  96  3.9   |  23.0  |  0.88    Ca    8.9      2019 09:52  Phos  3.0     06-30  Mg     2.2     06-30        Urinalysis Basic - ( 2019 07:04 )    Color: Yellow / Appearance: Clear / S.010 / pH: x  Gluc: x / Ketone: Small  / Bili: Negative / Urobili: Negative mg/dL   Blood: x / Protein: Negative mg/dL / Nitrite: Negative   Leuk Esterase: Negative / RBC: 6-10 /HPF / WBC 3-5   Sq Epi: x / Non Sq Epi: Occasional / Bacteria: Occasional        MEDICATIONS  (STANDING):  acetaminophen   Tablet .. 650 milliGRAM(s) Oral every 6 hours  ampicillin/sulbactam  IVPB      ampicillin/sulbactam  IVPB 3 Gram(s) IV Intermittent every 6 hours    MEDICATIONS  (PRN):  oxyCODONE    IR 5 milliGRAM(s) Oral every 4 hours PRN Moderate Pain (4 - 6)  oxyCODONE    IR 10 milliGRAM(s) Oral every 4 hours PRN Severe Pain (7 - 10)

## 2019-07-01 NOTE — BRIEF OPERATIVE NOTE - NSICDXBRIEFPREOP_GEN_ALL_CORE_FT
PRE-OP DIAGNOSIS:  Tear of ulnar collateral ligament of elbow 01-Jul-2019 13:48:38  Kennedy Moulton
PRE-OP DIAGNOSIS:  Dog bite of arm 01-Jul-2019 11:25:02  Chintan Parks A

## 2019-07-02 ENCOUNTER — TRANSCRIPTION ENCOUNTER (OUTPATIENT)
Age: 29
End: 2019-07-02

## 2019-07-02 PROCEDURE — 99231 SBSQ HOSP IP/OBS SF/LOW 25: CPT

## 2019-07-02 RX ORDER — ACETAMINOPHEN 500 MG
2 TABLET ORAL
Qty: 0 | Refills: 0 | DISCHARGE
Start: 2019-07-02

## 2019-07-02 RX ADMIN — OXYCODONE HYDROCHLORIDE 5 MILLIGRAM(S): 5 TABLET ORAL at 06:55

## 2019-07-02 RX ADMIN — Medication 650 MILLIGRAM(S): at 16:11

## 2019-07-02 RX ADMIN — OXYCODONE HYDROCHLORIDE 5 MILLIGRAM(S): 5 TABLET ORAL at 06:09

## 2019-07-02 RX ADMIN — AMPICILLIN SODIUM AND SULBACTAM SODIUM 200 GRAM(S): 250; 125 INJECTION, POWDER, FOR SUSPENSION INTRAMUSCULAR; INTRAVENOUS at 06:06

## 2019-07-02 RX ADMIN — AMPICILLIN SODIUM AND SULBACTAM SODIUM 200 GRAM(S): 250; 125 INJECTION, POWDER, FOR SUSPENSION INTRAMUSCULAR; INTRAVENOUS at 01:28

## 2019-07-02 RX ADMIN — Medication 650 MILLIGRAM(S): at 08:57

## 2019-07-02 RX ADMIN — AMPICILLIN SODIUM AND SULBACTAM SODIUM 200 GRAM(S): 250; 125 INJECTION, POWDER, FOR SUSPENSION INTRAMUSCULAR; INTRAVENOUS at 17:34

## 2019-07-02 RX ADMIN — AMPICILLIN SODIUM AND SULBACTAM SODIUM 200 GRAM(S): 250; 125 INJECTION, POWDER, FOR SUSPENSION INTRAMUSCULAR; INTRAVENOUS at 23:51

## 2019-07-02 RX ADMIN — Medication 650 MILLIGRAM(S): at 15:11

## 2019-07-02 RX ADMIN — AMPICILLIN SODIUM AND SULBACTAM SODIUM 200 GRAM(S): 250; 125 INJECTION, POWDER, FOR SUSPENSION INTRAMUSCULAR; INTRAVENOUS at 11:03

## 2019-07-02 RX ADMIN — Medication 100 MILLIGRAM(S): at 01:28

## 2019-07-02 RX ADMIN — Medication 650 MILLIGRAM(S): at 08:03

## 2019-07-02 NOTE — PROGRESS NOTE ADULT - SUBJECTIVE AND OBJECTIVE BOX
INTERVAL HPI/OVERNIGHT EVENTS:    SUBJECTIVE:  Pt examined overnight. sleeping comfortably. complained of some mild throbbing in 4th and 5th digits on RUE. pain well controlled. No n/v/sob. voiding, ambulating, and tolerating diet    MEDICATIONS  (STANDING):  acetaminophen   Tablet .. 650 milliGRAM(s) Oral every 6 hours  ampicillin/sulbactam  IVPB      ampicillin/sulbactam  IVPB 3 Gram(s) IV Intermittent every 6 hours    MEDICATIONS  (PRN):  oxyCODONE    IR 5 milliGRAM(s) Oral every 4 hours PRN Moderate Pain (4 - 6)  oxyCODONE    IR 10 milliGRAM(s) Oral every 4 hours PRN Severe Pain (7 - 10)      Vital Signs Last 24 Hrs  T(C): 37.1 (01 Jul 2019 21:38), Max: 37.3 (01 Jul 2019 04:27)  T(F): 98.7 (01 Jul 2019 21:38), Max: 99.2 (01 Jul 2019 04:27)  HR: 73 (01 Jul 2019 21:38) (71 - 96)  BP: 136/77 (01 Jul 2019 21:38) (115/77 - 136/77)  BP(mean): --  RR: 18 (01 Jul 2019 21:38) (14 - 19)  SpO2: 99% (01 Jul 2019 21:38) (96% - 100%)    PE  Gen: sleeping comfortably, a/ox3  CV: rrr  PULM: clear to auscultation bilaterally  RUE: JPx2 putting out minimal serosanguinous fluid. fingers warm to palpation and well perfused    I&O's Detail    01 Jul 2019 07:01  -  02 Jul 2019 02:18  --------------------------------------------------------  IN:    lactated ringers.: 250 mL    Other: 2000 mL    Solution: 50 mL    Solution: 100 mL  Total IN: 2400 mL    OUT:    Bulb: 40 mL    Bulb: 20 mL    Estimated Blood Loss: 30 mL    Voided: 250 mL  Total OUT: 340 mL    Total NET: 2060 mL          LABS:                        8.9    9.97  )-----------( 151      ( 01 Jul 2019 03:04 )             28.7     07-01    144  |  109<H>  |  14.0  ----------------------------<  106  3.8   |  24.0  |  0.79    Ca    9.2      01 Jul 2019 03:04  Phos  3.7     07-01  Mg     1.9     07-01      PT/INR - ( 01 Jul 2019 03:04 )   PT: 14.2 sec;   INR: 1.23 ratio         PTT - ( 01 Jul 2019 03:04 )  PTT:29.5 sec      RADIOLOGY & ADDITIONAL STUDIES:

## 2019-07-02 NOTE — DISCHARGE NOTE PROVIDER - NSDCCPTREATMENT_GEN_ALL_CORE_FT
PRINCIPAL PROCEDURE  Procedure: Reconstruction, ligament, ulnar collateral, elbow  Findings and Treatment:       SECONDARY PROCEDURE  Procedure: Neurolysis, peripheral  Findings and Treatment:

## 2019-07-02 NOTE — CHART NOTE - NSCHARTNOTEFT_GEN_A_CORE
Post Op Check    S/p decompression of ulnar/radial nerve    Subjective: sleeping comfortably. complained of some mild throbbing in 4th and 5th digits on RUE. pain well controlled. No n/v/sob. voiding, ambulating, and tolerating diet    PE:  Gen: sleeping comfortably, a/ox3  CV: rrr  PULM: clear to auscultation bilaterally  RUE: JPx2 putting out minimal serosanguinous fluid. fingers warm to palpation and well perfused    Assessment:   28 yo F doing well POD0 s/p decompression of ulnar/radial nerve  - will continue to monitor overnight  - Unasyn  - Regular diet  - Fu w/ pinksy 1 wk post op  - Dc on augmentin 7dy course  - f/u PT

## 2019-07-02 NOTE — PHYSICAL THERAPY INITIAL EVALUATION ADULT - ACTIVE RANGE OF MOTION EXAMINATION, REHAB EVAL
bilateral upper extremity Active ROM was WFL (within functional limits)/bilateral  lower extremity Active ROM was WFL (within functional limits)/deficits as listed below/RUE NT

## 2019-07-02 NOTE — DISCHARGE NOTE PROVIDER - NSDCCPCAREPLAN_GEN_ALL_CORE_FT
PRINCIPAL DISCHARGE DIAGNOSIS  Diagnosis: Open fracture of forearm  Assessment and Plan of Treatment: Follow up: Please call and make an appointment to see Dr. Parks & Dr. Craft 1 week after discharge. Also, please call and make an appointment with your primary care physician as per your usual schedule.   Activity: Remain non-weight bearing to your right upper extremity.  Diet: May continue regular diet.  Medications: Please take all home medications as prescribed by your primary care doctor. Continue antibiotics (augmentin) x 7 days, as prescribed. Pain medication has been prescribed for you. Please, take it as it has been prescribed, do not drive or operate heavy machinery while taking narcotics.  You are encouraged to take over-the-counter tylenol and/or ibuprofen for pain relief when you feel your pain no longer warrants the use of narcotic pain medications, however DO NOT TAKE percocet and tylenol at the same time as they contain the same active ingredient (acetaminophen). Take only percocet OR tylenol.  Wound Care: Please, keep wound sites clean and dry. It is OK to shower however make sure splint stays completely dry, if doing so. You will be discharged with RAJENDRA drains. You will need to empty them and record outputs accurately. This will be taught to you by the nursing staff. Please do not remove the RAJENDRA drains. They will be removed in the office. Please bring to the office accurate records of output.   Patient is advised to RETURN TO THE EMERGENCY DEPARTMENT for any of the following - worsening pain, fever/chills, nausea/vomiting, altered mental status, chest pain, shortness of breath, or any other new / worsening symptom.      SECONDARY DISCHARGE DIAGNOSES  Diagnosis: Tear of ulnar collateral ligament of right elbow  Assessment and Plan of Treatment:

## 2019-07-02 NOTE — PROGRESS NOTE ADULT - SUBJECTIVE AND OBJECTIVE BOX
Pt Name: MARIA DOLORES GILL    MRN: [id number]      Patient is a being followed for S/P open reduction internal fixation R HUMERUS W/ radial/ulnar nerve decompression      PAST MEDICAL & SURGICAL HISTORY:  PAST MEDICAL & SURGICAL HISTORY:  No pertinent past medical history  H/O  section      Allergies: No Known Allergies      Medications: acetaminophen   Tablet .. 650 milliGRAM(s) Oral every 6 hours  ampicillin/sulbactam  IVPB      ampicillin/sulbactam  IVPB 3 Gram(s) IV Intermittent every 6 hours  oxyCODONE    IR 5 milliGRAM(s) Oral every 4 hours PRN  oxyCODONE    IR 10 milliGRAM(s) Oral every 4 hours PRN        Ambulation: Walking independently                           8.9    9.97  )-----------( 151      ( 2019 03:04 )             28.7     07-01    144  |  109<H>  |  14.0  ----------------------------<  106  3.8   |  24.0  |  0.79    Ca    9.2      2019 03:04  Phos  3.7     07  Mg     1.9     07        PHYSICAL EXAM:    Vital Signs Last 24 Hrs  T(C): 37.1 (2019 21:38), Max: 37.1 (2019 21:38)  T(F): 98.7 (2019 21:38), Max: 98.7 (2019 21:38)  HR: 73 (2019 21:38) (71 - 93)  BP: 136/77 (2019 21:38) (115/77 - 136/77)  BP(mean): --  RR: 18 (2019 21:38) (14 - 19)  SpO2: 99% (2019 21:38) (96% - 100%)  Daily Height in cm: 144.78 (2019 13:52)    Daily     RUE:  Dressing C/D/I - ACE wrap without staining, splint of the RUE intact and in the appropriate positioning  Cap refill < 2 sec, fingers warm and normal in color  Palpable compartments soft and compressible  Motor exam limited due to splint application,  of 1st digit, +flexion/extension/abduction/adduction of 1st/2nd/3rd digits, decreased flexion/extension/adduction/abduction of the 4th/5th digits, decreased sensation to ulnar aspect of 4th digit and 5th digit      A/P:  Pt is a  29y Female S/P open reduction internal fixation R HUMERUS W/ radial/ulnar nerve decompression    PLAN:   -cont care  -pain control  -PT

## 2019-07-02 NOTE — PROGRESS NOTE ADULT - SUBJECTIVE AND OBJECTIVE BOX
HPI/OVERNIGHT EVENTS: Patient seen and examined at bedside this AM. No acute events overnight per nursing reports. Patient complains of tingling on 4th and 5th digits of RUE, pain well controlled, tolerating diet. Denies fever, chills, nausea, vomitting, chest pain, SOB, dizziness, abd pain or any other concerning symptoms    Vital Signs Last 24 Hrs  T(C): 37.1 (01 Jul 2019 21:38), Max: 37.1 (01 Jul 2019 21:38)  T(F): 98.7 (01 Jul 2019 21:38), Max: 98.7 (01 Jul 2019 21:38)  HR: 73 (01 Jul 2019 21:38) (71 - 93)  BP: 136/77 (01 Jul 2019 21:38) (115/77 - 136/77)  BP(mean): --  RR: 18 (01 Jul 2019 21:38) (14 - 19)  SpO2: 99% (01 Jul 2019 21:38) (96% - 100%)    I&O's Detail    01 Jul 2019 07:01  -  02 Jul 2019 07:00  --------------------------------------------------------  IN:    lactated ringers.: 250 mL    Other: 2000 mL    Solution: 50 mL    Solution: 100 mL  Total IN: 2400 mL    OUT:    Bulb: 45 mL    Bulb: 40 mL    Estimated Blood Loss: 30 mL    Voided: 250 mL  Total OUT: 365 mL    Total NET: 2035 mL              Constitutional: patient resting comfortably in bed, in no acute distress  HEENT: EOMI / PERRL b/l   Neck: No JVD, full ROM without pain  Respiratory: CTAB respirations are unlabored, no accessory muscle use, no conversational dyspnea  Cardiovascular: regular rate & rhythm   Gastrointestinal: Abdomen soft, non-tender, non-distended, no rebound tenderness / guarding  Neurological: GCS: 15 (4/5/6). A&O x 3; no gross sensory / motor / coordination deficits  Musculoskeletal: RUE with x2 RAJENDRA drains with serosanguinous output    LABS:                        8.9    9.97  )-----------( 151      ( 01 Jul 2019 03:04 )             28.7     07-01    144  |  109<H>  |  14.0  ----------------------------<  106  3.8   |  24.0  |  0.79    Ca    9.2      01 Jul 2019 03:04  Phos  3.7     07-01  Mg     1.9     07-01      PT/INR - ( 01 Jul 2019 03:04 )   PT: 14.2 sec;   INR: 1.23 ratio         PTT - ( 01 Jul 2019 03:04 )  PTT:29.5 sec      MEDICATIONS  (STANDING):  acetaminophen   Tablet .. 650 milliGRAM(s) Oral every 6 hours  ampicillin/sulbactam  IVPB      ampicillin/sulbactam  IVPB 3 Gram(s) IV Intermittent every 6 hours    MEDICATIONS  (PRN):  oxyCODONE    IR 5 milliGRAM(s) Oral every 4 hours PRN Moderate Pain (4 - 6)  oxyCODONE    IR 10 milliGRAM(s) Oral every 4 hours PRN Severe Pain (7 - 10)

## 2019-07-02 NOTE — DISCHARGE NOTE PROVIDER - CARE PROVIDER_API CALL
Chintan Parks)  Plastic Surgery; Surgery of the Hand  [address] [place] [address], [address]  [place] [address]  Phone: [phone]  Fax: [phone]  Follow Up Time:     Salvador Craft)  Orthopaedic Surgery  [address] [place] [address]  Phone: [phone]  Fax: [phone]  Follow Up Time:

## 2019-07-02 NOTE — DISCHARGE NOTE PROVIDER - NSDCHHHOMEBOUNDOTHER_GEN_ALL_CORE_FT
Frequency of wound care needs would make it a considerable & taxing effort for patient to leave home.

## 2019-07-02 NOTE — DISCHARGE NOTE PROVIDER - HOSPITAL COURSE
30yo F s/p dog bite [place] [facility] [place]  Pt saw dog running after her son.  Pt pushed son away and took the bite from the dog.  Dog kept shaking aggressively when attacking patient on the right arm.  Pt also was attacked on the L arm.  Pt's friend came and kicked the dog away from patient.  [place] [facility] [place], [place]yn, and percocet.  Wound was not washed out.  Pt reportedly had open fracture in RUE, but no final read was reported.  CD available.  No sob/cp/lightheadedness/dizziness.  Does have right hand weakness and sensory loss.  B/L RUE swelling.  Dog reportedly up to date on shots.        RUE xrays showed comminuted fracture of the medial right upper condyle. R hand XR negative.    CT angio of b/l UE showed acute, comminuted and displaced fracture of the medial epicondyle of     the right elbow; moderate subcutaneous inflammatory change in the right elbow, more so posteriorly, extending into the upper arm and forearm; associated foci of subcutaneous gas, more so in the right elbow, likely introduced via the lacerations; no associated rim-enhancing fluid collection to suggest an abscess; mild circumferential subcutaneous inflammatory change in the left forearm which is post traumatic in nature versus a cellulitis; associated foci of subcutaneous gas likely introduced via a laceration; no associated rim-enhancing fluid collection to suggest an abscess.        Patient was admitted to the trauma service. Ordered for IV abx. Ortho consulted - b/l upper extremity wounds washed out & RUE splinted. Patient taken to the OR on 7/1 jointly by ortho & plastics for ORIF & reconstruction of ulnar collateral ligament ; radial & ulnar nerves decompressed. Patient tolerated procedure well. Post-op she worked with PT & OT who recommended _____________________. 30yo F s/p dog bite on [place] [facility] [place]  Pt saw dog running after her son.  Pt pushed son away and took the bite from the dog.  Dog kept shaking aggressively when attacking patient on the right arm.  Pt also was attacked on the L arm.  Pt's friend came and kicked the dog away from patient.  [place] [facility] [place], zosyn, and percocet.  Wound was not washed out.  Pt reportedly had open fracture in RUE, but no final read was reported.  CD available.  No sob/cp/lightheadedness/dizziness.  Does have right hand weakness and sensory loss.  B/L RUE swelling.  Dog reportedly up to date on shots.        RUE xrays showed comminuted fracture of the medial right upper condyle. R hand XR negative.    CT angio of b/l UE showed acute, comminuted and displaced fracture of the medial epicondyle of     the right elbow; moderate subcutaneous inflammatory change in the right elbow, more so posteriorly, extending into the upper arm and forearm; associated foci of subcutaneous gas, more so in the right elbow, likely introduced via the lacerations; no associated rim-enhancing fluid collection to suggest an abscess; mild circumferential subcutaneous inflammatory change in the left forearm which is post traumatic in nature versus a cellulitis; associated foci of subcutaneous gas likely introduced via a laceration; no associated rim-enhancing fluid collection to suggest an abscess.        Patient was admitted to the trauma service. Ordered for IV abx. Ortho consulted - b/l upper extremity wounds washed out & RUE splinted. Patient taken to the OR on 7/1 jointly by ortho & plastics for ORIF & reconstruction of ulnar collateral ligament ; radial & ulnar nerves decompressed. Patient tolerated procedure well. Post-op she worked with PT who states pt has no PT needs & safe for discharge home.  OT recommended _____________________.

## 2019-07-03 ENCOUNTER — TRANSCRIPTION ENCOUNTER (OUTPATIENT)
Age: 29
End: 2019-07-03

## 2019-07-03 VITALS
SYSTOLIC BLOOD PRESSURE: 143 MMHG | HEART RATE: 70 BPM | RESPIRATION RATE: 18 BRPM | TEMPERATURE: 98 F | DIASTOLIC BLOOD PRESSURE: 87 MMHG

## 2019-07-03 PROCEDURE — 99238 HOSP IP/OBS DSCHRG MGMT 30/<: CPT

## 2019-07-03 RX ADMIN — AMPICILLIN SODIUM AND SULBACTAM SODIUM 200 GRAM(S): 250; 125 INJECTION, POWDER, FOR SUSPENSION INTRAMUSCULAR; INTRAVENOUS at 05:58

## 2019-07-03 RX ADMIN — Medication 650 MILLIGRAM(S): at 08:04

## 2019-07-03 RX ADMIN — Medication 650 MILLIGRAM(S): at 08:37

## 2019-07-03 NOTE — PROGRESS NOTE ADULT - SUBJECTIVE AND OBJECTIVE BOX
Pt Name: MARIA DOLORES GILL    MRN: [id number]      Patient is a 30 yo F being followed for R open distal humerus fx with associated radial/ulnar nerve injury following dog bite. Patient is now POD #2 s/p R elbow UCL recon, wound exploration, radial/ulnar nerve decompression, and neurolysis. No acute events reported overnight. Patient was seen and evaluated at bedside this AM. Patient is doing well and is in good spirits. Pain is well controlled with medications. Patient is tolerating diet and has voided post-op. Patient is participating in therapy. She notes that sensation in her hand has improved and that it is getting easier for her to move her fingers. Patient denies fever, chills, chest pain, SOB, abdominal pain, calf pain. She endorses decreased sensation in the 4th/5th fingers of the injured extremity.     PAST MEDICAL & SURGICAL HISTORY:  No pertinent past medical history  H/O  section      Allergies: No Known Allergies      Medications: acetaminophen   Tablet .. 650 milliGRAM(s) Oral every 6 hours  ampicillin/sulbactam  IVPB      ampicillin/sulbactam  IVPB 3 Gram(s) IV Intermittent every 6 hours  oxyCODONE    IR 5 milliGRAM(s) Oral every 4 hours PRN  oxyCODONE    IR 10 milliGRAM(s) Oral every 4 hours PRN              PHYSICAL EXAM:    Vital Signs Last 24 Hrs  T(C): 36.8 (2019 07:50), Max: 37 (2019 16:22)  T(F): 98.3 (2019 07:50), Max: 98.6 (2019 16:22)  HR: 70 (2019 07:50) (70 - 87)  BP: 143/87 (2019 07:50) (124/76 - 145/80)  BP(mean): --  RR: 18 (2019 07:50) (16 - 18)  SpO2: 98% (2019 22:17) (97% - 99%)  Daily     Daily     Appearance: Alert, responsive, in no acute distress.    Skin: no rash on visible skin. Skin is clean, dry and intact. No bleeding. No abrasions. No ulcerations.    Musculoskeletal:         Right Upper Extremity: Skin warm and intact where exposed. Long-arm posterior slab splint is clean, dry, and intact and appropriately positioned. RAJENDRA Drain x2 to self suction with serous drainage. No drainage noted on splint. No erythema, induration, or ecchymosis of exposed skin. Elbow ROM not assessed at this time. Diminished sensation in ulnar distribution of 4th/5th digits. Sensation intact in median/radial nerve distributions. Patient has difficulty cross 2nd/3rd digits as well as difficulty with finger abduction. Patient able to flex and extend digits. AIN function intact. Extremity appears well perfused.        Bilateral Lower Extremity: Calf supple and nontender.       A/P:  Pt is a 29y Female POD #2 s/p R elbow exploration and nerve decompression.     PLAN:   -Pain control.  -DVT PPx.  -PT/OT.   -Remaining management as per primary team.  -NWB.

## 2019-07-03 NOTE — DISCHARGE NOTE NURSING/CASE MANAGEMENT/SOCIAL WORK - NSDPLANG ASIS_GEN_ALL_CORE
2018  EMPLOYEE INFORMATION: EMPLOYER INFORMATION:   NAME: Benjamin Connolly Jr. [employer] ( ALL SITES)   : 1981 [phone]   DATE OF INJURY/EVENT: 2018           Location: [facility] [place] [facility]   Treating Provider: Gladys Velez PA-C  Time In:  9:18 AM Time Out:  10:16 AM      DIAGNOSIS:   1. Contusion of left elbow, initial encounter      STATUS: This injury is determined to be WORK RELATED.    RETURN TO WORK:  Employee may return to work with restrictions.     Return Date: 2018            RESTRICTIONS:   Restrictions are to be followed at work and at home.  Restrictions are in effect until next follow-up visit.  No lifting, pushing or pulling greater than 20 pounds.  No repetitive activity with the left upper extremity.  The elbow sleeve can be worn for comfort.     TREATMENT PLAN:  Medications for this injury/condition:   Ibuprofen every 6-8 hours with food.   Referral/Consult:  Diagnostic Testing:   XR ELBOW 4 VW LEFT   Drug test not required.  Breath alcohol test not required.      Instructions:   Return to the clinic sooner if your symptoms worsen, change or you have any other concerns. Ice to the area to help with your discomfort. You may wear the sleeve to help with discomfort. Remove and do gentle range of motion exercises to prevent stiffness.     NEXT RETURN VISIT:  at 1:00pm.    Thank you for the privilege of providing medical care for this injury/condition.  If there are any questions, please call the occupational health clinic at Dept: [phone].    Electronically signed on 2018 at 10:10 AM by:   Gladys Velez PA-C   [facility]    The physician below agrees with the plan and restrictions placed on the patient by the provider above.  Mick Hernandez MD     No

## 2019-07-03 NOTE — DISCHARGE NOTE NURSING/CASE MANAGEMENT/SOCIAL WORK - NSDCDPATPORTLINK_GEN_ALL_CORE
You can access the [facility], offered by [facility], by registering with the following website: [email or web address]

## 2019-07-03 NOTE — PROGRESS NOTE ADULT - REASON FOR ADMISSION
dog bite on bilateral arms, R>L, open fracture on right arm

## 2019-07-03 NOTE — OCCUPATIONAL THERAPY INITIAL EVALUATION ADULT - GENERAL OBSERVATIONS, REHAB EVAL
Received pt in semi-trent position in bed, +IV lock, +RUE soft cast and bulb drains, boyfriend present; pt agrees to OT.

## 2019-07-03 NOTE — PROGRESS NOTE ADULT - ASSESSMENT
28yo F s/p dog bite on LUE w/o signs of infection.  -Continue Abx  -NPO  -Poss OR today w Dr. Parks  -Poss D/C after surgery
30 yo F doing well POD1 s/p Exploration of right upper extremity, decompression/repair of ulnar nerve and radial nerve, internal neurolysis.  - will continue to monitor , neuro exams  - Unasyn  - Regular diet  -Pain control, minimize narcotics  -OOB/AMB/ SCDs  - Fu w/ pinksy 1 wk post op  - Dc on augmentin 7dy course per Dr. Parks; To be confirmed by Dr. Craft  - f/u PT.
28yo F s/p dog bite who is improving.  -Cont abx  -Daily dssg chagnes  -OR for nerve assesment and condyle fracture.  -Preop  -OOB
30 yo F doing well POD1 s/p decompression of ulnar/radial nerve  - Continue Unasyn  - d/c with 1 week Augmentin course  - f/u w/ Dr. Parks 1 week after d/c  - No further plastic surgery intervention at this time
30 yo F doing well POD1 s/p decompression of ulnar/radial nerve  - Continue Unasyn  - f/u PT  - Fu w/ pinksy 1 wk post op  - Dc on augmentin 7dy course
30y/o F 4h postop for ulnar and radial nerves damage assessment in pain 10/10, with right hand strength and sensation improvement.     Plan: continue current medication & postop checks, double ATB (Ampicillin Sulbactam + Cefazolin), pain control and f/u PT in the future. Dr. Parks consult in 1w postSx. Discharge on Augmentine for 7d

## 2019-07-03 NOTE — OCCUPATIONAL THERAPY INITIAL EVALUATION ADULT - MANUAL MUSCLE TESTING RESULTS, REHAB EVAL
RUE not tested due to RUE NWB status; left shoulder grossly assessed with AROM against gravity 3/5, left elbow grossly assessed with AROM against gravity 3/5, left gross grasp 4/5

## 2019-07-03 NOTE — OCCUPATIONAL THERAPY INITIAL EVALUATION ADULT - RANGE OF MOTION EXAMINATION, UPPER EXTREMITY
RUE not tested (elbow, wrist and majority of digits covered with casting) however pt observed wiggling right finger tips/Left UE Active ROM was WFL (within functional limits)

## 2019-07-03 NOTE — OCCUPATIONAL THERAPY INITIAL EVALUATION ADULT - SENSORY TESTS
pt denies changes with sensation; pt with +bilateral pedal pulses; pt with +capillary refill in bilateral hand digits; unable to palpate right radial pulse due to casting

## 2019-07-03 NOTE — OCCUPATIONAL THERAPY INITIAL EVALUATION ADULT - ADDITIONAL COMMENTS
Pt lives in house with no ALFREDO and no steps inside; bedroom and bathroom are on main level. Bathroom has bathtub with doors. Pt does not own any DME. Pt is right handed. Pt drives. Pt's mother is off from work for the summer and is able/available to assist upon discharge. Pt's boyfriend works however can also be around to assist at times.

## 2019-07-03 NOTE — OCCUPATIONAL THERAPY INITIAL EVALUATION ADULT - LEVEL OF INDEPENDENCE:TOILET, OT EVAL
independent/not observed however pt reports using bathroom prior to evaluation with left hand only to hygiene and manage underwear

## 2019-07-03 NOTE — PROGRESS NOTE ADULT - SUBJECTIVE AND OBJECTIVE BOX
HPI/OVERNIGHT EVENTS: Patient seen and examined at bedside this AM. No acute events overnight per nursing reports. Patient complains of tingling on 4th and 5th digits of RUE, pain well controlled, tolerating diet. Denies fever, chills, nausea, vomitting, chest pain, SOB, dizziness, abd pain or any other concerning symptoms    Vital Signs Last 24 Hrs  T(C): 36.9 (02 Jul 2019 22:17), Max: 37 (02 Jul 2019 16:22)  T(F): 98.4 (02 Jul 2019 22:17), Max: 98.6 (02 Jul 2019 16:22)  HR: 87 (02 Jul 2019 22:17) (76 - 87)  BP: 145/80 (02 Jul 2019 22:17) (124/76 - 145/80)  BP(mean): --  RR: 18 (02 Jul 2019 22:17) (16 - 18)  SpO2: 98% (02 Jul 2019 22:17) (94% - 99%)    I&O's Detail    01 Jul 2019 07:01  -  02 Jul 2019 07:00  --------------------------------------------------------  IN:    lactated ringers.: 250 mL    Other: 2000 mL    Solution: 50 mL    Solution: 100 mL  Total IN: 2400 mL    OUT:    Bulb: 45 mL    Bulb: 40 mL    Estimated Blood Loss: 30 mL    Voided: 250 mL  Total OUT: 365 mL    Total NET: 2035 mL      02 Jul 2019 07:01  -  03 Jul 2019 02:22  --------------------------------------------------------  IN:    Solution: 100 mL  Total IN: 100 mL    OUT:    Bulb: 15 mL    Bulb: 5 mL  Total OUT: 20 mL    Total NET: 80 mL            Constitutional: patient resting comfortably in bed, in no acute distress  HEENT: EOMI / PERRL b/l   Neck: No JVD, full ROM without pain  Respiratory: CTAB respirations are unlabored, no accessory muscle use, no conversational dyspnea  Cardiovascular: regular rate & rhythm   Gastrointestinal: Abdomen soft, non-tender, non-distended, no rebound tenderness / guarding  Neurological: GCS: 15 (4/5/6). A&O x 3; no gross sensory / motor / coordination deficits  Musculoskeletal: RUE with x2 RAJENDRA drains with serosanguinous output    LABS:                        8.9    9.97  )-----------( 151      ( 01 Jul 2019 03:04 )             28.7     07-01    144  |  109<H>  |  14.0  ----------------------------<  106  3.8   |  24.0  |  0.79    Ca    9.2      01 Jul 2019 03:04  Phos  3.7     07-01  Mg     1.9     07-01      PT/INR - ( 01 Jul 2019 03:04 )   PT: 14.2 sec;   INR: 1.23 ratio         PTT - ( 01 Jul 2019 03:04 )  PTT:29.5 sec      MEDICATIONS  (STANDING):  acetaminophen   Tablet .. 650 milliGRAM(s) Oral every 6 hours  ampicillin/sulbactam  IVPB      ampicillin/sulbactam  IVPB 3 Gram(s) IV Intermittent every 6 hours    MEDICATIONS  (PRN):  oxyCODONE    IR 5 milliGRAM(s) Oral every 4 hours PRN Moderate Pain (4 - 6)  oxyCODONE    IR 10 milliGRAM(s) Oral every 4 hours PRN Severe Pain (7 - 10)

## 2019-07-03 NOTE — PROGRESS NOTE ADULT - PROVIDER SPECIALTY LIST ADULT
Orthopedics
Plastic Surgery
Surgery
Trauma Surgery
Orthopedics
Surgery
Trauma Surgery

## 2019-07-10 PROCEDURE — 96375 TX/PRO/DX INJ NEW DRUG ADDON: CPT

## 2019-07-10 PROCEDURE — 73206 CT ANGIO UPR EXTRM W/O&W/DYE: CPT

## 2019-07-10 PROCEDURE — 99261: CPT

## 2019-07-10 PROCEDURE — 83605 ASSAY OF LACTIC ACID: CPT

## 2019-07-10 PROCEDURE — 96374 THER/PROPH/DIAG INJ IV PUSH: CPT

## 2019-07-10 PROCEDURE — 80053 COMPREHEN METABOLIC PANEL: CPT

## 2019-07-10 PROCEDURE — C1713: CPT

## 2019-07-10 PROCEDURE — 86900 BLOOD TYPING SEROLOGIC ABO: CPT

## 2019-07-10 PROCEDURE — 73130 X-RAY EXAM OF HAND: CPT

## 2019-07-10 PROCEDURE — 86923 COMPATIBILITY TEST ELECTRIC: CPT

## 2019-07-10 PROCEDURE — 99285 EMERGENCY DEPT VISIT HI MDM: CPT | Mod: 25

## 2019-07-10 PROCEDURE — 73090 X-RAY EXAM OF FOREARM: CPT

## 2019-07-10 PROCEDURE — 85730 THROMBOPLASTIN TIME PARTIAL: CPT

## 2019-07-10 PROCEDURE — 73060 X-RAY EXAM OF HUMERUS: CPT

## 2019-07-10 PROCEDURE — 80048 BASIC METABOLIC PNL TOTAL CA: CPT

## 2019-07-10 PROCEDURE — 83735 ASSAY OF MAGNESIUM: CPT

## 2019-07-10 PROCEDURE — 97167 OT EVAL HIGH COMPLEX 60 MIN: CPT

## 2019-07-10 PROCEDURE — 84100 ASSAY OF PHOSPHORUS: CPT

## 2019-07-10 PROCEDURE — 73080 X-RAY EXAM OF ELBOW: CPT

## 2019-07-10 PROCEDURE — 86901 BLOOD TYPING SEROLOGIC RH(D): CPT

## 2019-07-10 PROCEDURE — 71045 X-RAY EXAM CHEST 1 VIEW: CPT

## 2019-07-10 PROCEDURE — 84702 CHORIONIC GONADOTROPIN TEST: CPT

## 2019-07-10 PROCEDURE — 83690 ASSAY OF LIPASE: CPT

## 2019-07-10 PROCEDURE — 36415 COLL VENOUS BLD VENIPUNCTURE: CPT

## 2019-07-10 PROCEDURE — 73070 X-RAY EXAM OF ELBOW: CPT

## 2019-07-10 PROCEDURE — 86850 RBC ANTIBODY SCREEN: CPT

## 2019-07-10 PROCEDURE — 80307 DRUG TEST PRSMV CHEM ANLYZR: CPT

## 2019-07-10 PROCEDURE — 85610 PROTHROMBIN TIME: CPT

## 2019-07-10 PROCEDURE — 97163 PT EVAL HIGH COMPLEX 45 MIN: CPT

## 2019-07-10 PROCEDURE — 81025 URINE PREGNANCY TEST: CPT

## 2019-07-10 PROCEDURE — 85027 COMPLETE CBC AUTOMATED: CPT

## 2019-07-10 PROCEDURE — 81001 URINALYSIS AUTO W/SCOPE: CPT

## 2019-07-16 ENCOUNTER — OTHER (OUTPATIENT)
Age: 29
End: 2019-07-16

## 2019-07-17 ENCOUNTER — APPOINTMENT (OUTPATIENT)
Dept: ORTHOPEDIC SURGERY | Facility: CLINIC | Age: 29
End: 2019-07-17
Payer: MEDICAID

## 2019-07-17 DIAGNOSIS — Z78.9 OTHER SPECIFIED HEALTH STATUS: ICD-10-CM

## 2019-07-17 PROCEDURE — 99024 POSTOP FOLLOW-UP VISIT: CPT

## 2019-07-17 NOTE — HISTORY OF PRESENT ILLNESS
[Xray (Date:___)] : [unfilled] Xray -  [Hardware in Good Position] : hardware in good position [Doing Well] : is doing well [No Sign of Infection] : is showing no signs of infection [Adequate Pain Control] : has adequate pain control [de-identified] : s/p exploration right upper extremity wounds. Right ulnar nerve decompression at the elbow with internal neurolysis. Right radial nerve decompression at the elbow and radial tunnel with internal neurolysis of the posterior interosseus nerve. 7/1/19 [de-identified] : 28 yo  f s/p exploration right upper extremity wounds. Right ulnar nerve decompression at the elbow with internal neurolysis. Right radial nerve decompression at the elbow and radial tunnel with internal neurolysis of the posterior interosseus nerve. 7/1/19 here for pop visit.  Patient is also followed by Dr. Parks who removed her drains and monitoring her incisions.  She is changing the dressings daily and has a wrist brace and sling.  She has not started any OT yet but is scheduled to start in a few days.  She has had low grade temp 99 on/off for the last 2 days.  No chills, otherwise feels fine. [de-identified] : incisions c/d/i, + swelling of arm and hand, unable to extend  first 3 fingers fully.   [de-identified] : xray right elbow 3 views [de-identified] : Patient will f/u with Dr. Parks and start OT.  Wound care as per Dr. Parks.  She will return in about 1 month for eval and xray right elbow.

## 2019-10-09 ENCOUNTER — APPOINTMENT (OUTPATIENT)
Dept: ORTHOPEDIC SURGERY | Facility: CLINIC | Age: 29
End: 2019-10-09
Payer: COMMERCIAL

## 2019-10-09 VITALS
SYSTOLIC BLOOD PRESSURE: 121 MMHG | HEART RATE: 73 BPM | HEIGHT: 57 IN | TEMPERATURE: 98.7 F | BODY MASS INDEX: 21.57 KG/M2 | DIASTOLIC BLOOD PRESSURE: 87 MMHG | WEIGHT: 100 LBS

## 2019-10-09 PROCEDURE — 73080 X-RAY EXAM OF ELBOW: CPT | Mod: TC,RT

## 2019-10-09 PROCEDURE — 99213 OFFICE O/P EST LOW 20 MIN: CPT

## 2019-10-09 NOTE — HISTORY OF PRESENT ILLNESS
[de-identified] : 30 yo f s/p exploration right upper extremity wounds. Right ulnar nerve decompression at the elbow with internal neurolysis. Right radial nerve decompression at the elbow and radial tunnel with internal neurolysis of the posterior interosseus nerve. 7/1/19. here for f/u appt.  Patient has been doing well.  She is currently in Occupational therapy.  She reports that she is weight bearing and does not have any restrictions for working on  ROM, as per Dr. Parks.  Her last appt with him was about three weeks ago and she will see him again in 2 months.  She reports mild pain for which she takes Ibuprofen.  She also reports some decreased sensation of the arm and hand which has improved since last visit.

## 2019-10-09 NOTE — DISCUSSION/SUMMARY
[de-identified] : Patient will continue with occupational therapy.  She will f/u with Dr. Parks.  She will call office for any issues or concerns, otherwise she will return to office in 6 weeks for f/u and xrays right elbow.

## 2019-10-09 NOTE — PHYSICAL EXAM
[de-identified] : RUE:  no swelling , no ecchymosis, elbow ROM:  , able to extend and flex wrist, moving all fingers, decreased abduction of the thumb, sensation slightly decreased over some areas of the forearm. [de-identified] : xrays right elbow: 3 views:  hardware in place\par

## 2019-11-19 ENCOUNTER — APPOINTMENT (OUTPATIENT)
Dept: ORTHOPEDIC SURGERY | Facility: CLINIC | Age: 29
End: 2019-11-19
Payer: MEDICAID

## 2019-11-19 PROCEDURE — 73080 X-RAY EXAM OF ELBOW: CPT | Mod: TC,RT

## 2019-11-19 PROCEDURE — 99213 OFFICE O/P EST LOW 20 MIN: CPT

## 2019-11-20 NOTE — DISCUSSION/SUMMARY
Problem: Pain, Acute (Adult)  Goal: Acceptable Pain Control/Comfort Level  Outcome: Ongoing (interventions implemented as appropriate)    01/19/17 1954   Pain, Acute (Adult)   Acceptable Pain Control/Comfort Level making progress toward outcome            [de-identified] : 30 yo f s/p ORIF right elbow with recent onset of pain and swelling.  She will rest, ice and take NSAIDs prn for pain and swelling.  She will hold off on PT for the next few days until pain improves.    Xrays will be reviewed by Dr. Craft.  Plan will be discussed after he reviews xrays. \par Addendum:  xrays reviewed by Dr. Craft.  He suggests continue PT.  Patient will also f/u wit Dr. Parks.  If pain does not improve, refer to sports med(dr. Mallory).

## 2019-11-20 NOTE — HISTORY OF PRESENT ILLNESS
[de-identified] : 28 yo f s/p exploration right upper extremity wounds. Right ulnar nerve decompression at the elbow with internal neurolysis. Right radial nerve decompression at the elbow and radial tunnel with internal neurolysis of the posterior interosseus nerve. 7/1/19. here for f/u appt. Patient has been doing well until a couple of weeks ago when she started having pain and swelling in her elbow.  she denies any injury or trauma to the elbow.  She is currently in Occupational therapy. She has another appt scheduled with Dr. Parks. \par

## 2019-11-20 NOTE — PHYSICAL EXAM
[de-identified] : right elbow:  wounds well healed, no redness, no drainage,  ROM  with pain at extension and flexion,\par tenderness over posterior and lateral aspects of the elbow, mold swelling noted on the lateral side of elbow. [de-identified] : xrays right elbow 3 views:  hardware in place.

## 2019-11-21 ENCOUNTER — MOBILE ON CALL (OUTPATIENT)
Age: 29
End: 2019-11-21

## 2019-11-21 ENCOUNTER — OTHER (OUTPATIENT)
Age: 29
End: 2019-11-21

## 2019-11-22 ENCOUNTER — APPOINTMENT (OUTPATIENT)
Dept: PHYSICAL MEDICINE AND REHAB | Facility: CLINIC | Age: 29
End: 2019-11-22
Payer: MEDICAID

## 2019-11-22 VITALS
BODY MASS INDEX: 21.57 KG/M2 | DIASTOLIC BLOOD PRESSURE: 73 MMHG | HEIGHT: 57 IN | WEIGHT: 100 LBS | HEART RATE: 80 BPM | SYSTOLIC BLOOD PRESSURE: 115 MMHG

## 2019-11-22 PROCEDURE — 95886 MUSC TEST DONE W/N TEST COMP: CPT

## 2019-11-22 PROCEDURE — 95909 NRV CNDJ TST 5-6 STUDIES: CPT

## 2019-11-26 ENCOUNTER — APPOINTMENT (OUTPATIENT)
Dept: ORTHOPEDIC SURGERY | Facility: CLINIC | Age: 29
End: 2019-11-26

## 2019-11-26 ENCOUNTER — APPOINTMENT (OUTPATIENT)
Dept: ORTHOPEDIC SURGERY | Facility: CLINIC | Age: 29
End: 2019-11-26
Payer: MEDICAID

## 2019-11-26 PROCEDURE — 99213 OFFICE O/P EST LOW 20 MIN: CPT

## 2019-11-26 RX ORDER — SERTRALINE HYDROCHLORIDE 50 MG/1
50 TABLET, FILM COATED ORAL
Refills: 0 | Status: ACTIVE | COMMUNITY

## 2019-11-26 RX ORDER — PROPRANOLOL HYDROCHLORIDE 10 MG/1
10 TABLET ORAL
Refills: 0 | Status: ACTIVE | COMMUNITY

## 2019-11-26 RX ORDER — GABAPENTIN 300 MG/1
300 CAPSULE ORAL
Qty: 90 | Refills: 0 | Status: ACTIVE | COMMUNITY
Start: 2019-11-26 | End: 1900-01-01

## 2019-11-26 NOTE — HISTORY OF PRESENT ILLNESS
[de-identified] : the patient is a pleasant 29-year-old female returns for followup of right arm pain. She suffered multiple dog bites over the summer and had surgery with both myself and Dr. Parks  repair of the nerves of the forearm as well as repair of the medial condyle. She was doing well and returned to work but is having significant pain currently. she had  EMG performed since her last visit. The patient states the pain is made worse with touching of the affected areas and relieved with rest.  9/10

## 2019-11-26 NOTE — DISCUSSION/SUMMARY
[de-identified] : 29-year-old female with a four-month old injury to the arm from dog bite. She had been recovering well but now has significant radial and ulnar pain. her pain is ight along the distributions of the radial and ulnar nerves.This does not correspond to her injury exactly but we want to control her pain and then we can help figure out why she is having this new onset pain 4 months post injury. She will start gabapentin and let us know how it is doing. I would recommend followup with Dr. Simmons as well. \par \par The patient was given the opportunity to ask questions and all questions were answered to their satisfaction.\par \par Salvador Craft MD\par Orthopaedic Trauma Surgeon\par [facility]\par [facility] [place]\par \par \par \par

## 2019-11-26 NOTE — REASON FOR VISIT
[Follow-Up Visit] : a follow-up visit for [FreeTextEntry2] : Right elbow pain. here to discuss EMG results.

## 2019-11-26 NOTE — PHYSICAL EXAM
[de-identified] : Physical Exam:\par General: Well appearing, no acute distress, A&O\par Neurologic: A&Ox3, No focal deficits\par Head: NCAT without abrasions, lacerations, or ecchymosis to head, face, or scalp\par Respiratory: Equal chest wall expansion bilaterally, no accessory muscle use\par Lymphatic: No lymphadenopathy palpated\par Skin: Warm and dry\par Psychiatric: Normal mood and affect\par \par right upper extremity:\par well-healed surgical scars.\par decreased sensation in the ulnar and radial nerve distributions. Intact median nerve.\par near full range of motion of hand, wrist, fingers, elbow\par positive Tinel's sign at radial nerve and the spiral groove and ulnar nerve at the elbow [de-identified] : no new imaging today

## 2020-01-07 ENCOUNTER — EMERGENCY (EMERGENCY)
Facility: HOSPITAL | Age: 30
LOS: 1 days | Discharge: HOME | End: 2020-01-07
Attending: EMERGENCY MEDICINE
Payer: MEDICAID

## 2020-01-07 VITALS
DIASTOLIC BLOOD PRESSURE: 73 MMHG | TEMPERATURE: 98 F | HEIGHT: 57 IN | WEIGHT: 95.02 LBS | RESPIRATION RATE: 18 BRPM | HEART RATE: 89 BPM | OXYGEN SATURATION: 100 % | SYSTOLIC BLOOD PRESSURE: 109 MMHG

## 2020-01-07 DIAGNOSIS — Z98.891 HISTORY OF UTERINE SCAR FROM PREVIOUS SURGERY: Chronic | ICD-10-CM

## 2020-01-07 PROCEDURE — 99283 EMERGENCY DEPT VISIT LOW MDM: CPT

## 2020-01-07 RX ORDER — ACETAMINOPHEN 500 MG
650 TABLET ORAL ONCE
Refills: 0 | Status: COMPLETED | OUTPATIENT
Start: 2020-01-07 | End: 2020-01-07

## 2020-01-07 RX ORDER — IBUPROFEN 200 MG
600 TABLET ORAL ONCE
Refills: 0 | Status: COMPLETED | OUTPATIENT
Start: 2020-01-07 | End: 2020-01-07

## 2020-01-07 RX ADMIN — Medication 600 MILLIGRAM(S): at 20:06

## 2020-01-07 RX ADMIN — Medication 650 MILLIGRAM(S): at 20:07

## 2020-01-07 NOTE — ED ADULT NURSE NOTE - PRO INTERPRETER NEED 2
EXAM DATE/TIME:  01/16/2018 03:28 

 

[facility] COMPARISON:     

No previous studies available for comparison.

 

                     

INDICATIONS :     

Wrist pain from fall.

                     

 

MEDICAL HISTORY :     

None.          

 

SURGICAL HISTORY :     

None.   

 

ENCOUNTER:     

Initial                                        

 

ACUITY:     

1 day      

 

PAIN SCORE:     

3/10

 

LOCATION:     

Right  wrist

 

FINDINGS:     

No fracture seen. Carpal bones appear normally aligned. There is some degenerative change at the firs
t MCP joint.

 

CONCLUSION:     No fracture is seen.

 

 

 

 Oswaldo Rodriguez MD on January 16, 2018 at 3:48           

Board Certified Radiologist.

 This report was verified electronically.
English

## 2020-01-07 NOTE — ED PROVIDER NOTE - PATIENT PORTAL LINK FT
You can access the FollowMyHealth Patient Portal offered by [facility] by registering at the following website: [email or web address]. By joining [employer]’s FollowMyHealth portal, you will also be able to view your health information using other applications (apps) compatible with our system.

## 2020-01-07 NOTE — ED ADULT TRIAGE NOTE - CHIEF COMPLAINT QUOTE
was attacked by a pitbull 7 months ago, has had complications ever since, patient has been excruciating ever since.

## 2020-01-07 NOTE — ED PROVIDER NOTE - OBJECTIVE STATEMENT
30 y/o Female pt c/o severe R arm pain s/p dog attack 7 months ago. Pt states pain radiates down arm her R arm and has R arm swelling x8 weeks. Pt reports associated numbing of the fingers on R hand. denies fever. denies HA or neck pain. no chest pain or sob. no abd pain. no n/v/d. no urinary f/u/d. no back pain. no motor deficits. denies illicit drug use. no recent travel. no rash. no other acute issues symptoms or concerns

## 2020-01-08 ENCOUNTER — APPOINTMENT (OUTPATIENT)
Dept: ORTHOPEDIC SURGERY | Facility: CLINIC | Age: 30
End: 2020-01-08
Payer: MEDICAID

## 2020-01-08 VITALS
DIASTOLIC BLOOD PRESSURE: 77 MMHG | HEART RATE: 71 BPM | HEIGHT: 57 IN | SYSTOLIC BLOOD PRESSURE: 125 MMHG | BODY MASS INDEX: 21.57 KG/M2 | WEIGHT: 100 LBS

## 2020-01-08 DIAGNOSIS — G56.21 LESION OF ULNAR NERVE, RIGHT UPPER LIMB: ICD-10-CM

## 2020-01-08 DIAGNOSIS — M25.521 PAIN IN RIGHT ELBOW: ICD-10-CM

## 2020-01-08 DIAGNOSIS — G56.31 LESION OF RADIAL NERVE, RIGHT UPPER LIMB: ICD-10-CM

## 2020-01-08 DIAGNOSIS — S42.441D DISPLACED FRACTURE (AVULSION) OF MEDIAL EPICONDYLE OF RIGHT HUMERUS, SUBSEQUENT ENCOUNTER FOR FRACTURE WITH ROUTINE HEALING: ICD-10-CM

## 2020-01-08 PROCEDURE — 73080 X-RAY EXAM OF ELBOW: CPT | Mod: RT

## 2020-01-08 PROCEDURE — 99214 OFFICE O/P EST MOD 30 MIN: CPT

## 2020-03-13 ENCOUNTER — OUTPATIENT (OUTPATIENT)
Dept: OUTPATIENT SERVICES | Facility: HOSPITAL | Age: 30
LOS: 1 days | End: 2020-03-13
Payer: MEDICAID

## 2020-03-13 VITALS
WEIGHT: 108.91 LBS | HEART RATE: 78 BPM | HEIGHT: 57 IN | TEMPERATURE: 98 F | SYSTOLIC BLOOD PRESSURE: 115 MMHG | OXYGEN SATURATION: 98 % | RESPIRATION RATE: 16 BRPM | DIASTOLIC BLOOD PRESSURE: 72 MMHG

## 2020-03-13 DIAGNOSIS — Z98.890 OTHER SPECIFIED POSTPROCEDURAL STATES: Chronic | ICD-10-CM

## 2020-03-13 DIAGNOSIS — M25.521 PAIN IN RIGHT ELBOW: ICD-10-CM

## 2020-03-13 DIAGNOSIS — Z98.891 HISTORY OF UTERINE SCAR FROM PREVIOUS SURGERY: Chronic | ICD-10-CM

## 2020-03-13 DIAGNOSIS — Z01.818 ENCOUNTER FOR OTHER PREPROCEDURAL EXAMINATION: ICD-10-CM

## 2020-03-13 LAB
ANION GAP SERPL CALC-SCNC: 6 MMOL/L — SIGNIFICANT CHANGE UP (ref 5–17)
BUN SERPL-MCNC: 12 MG/DL — SIGNIFICANT CHANGE UP (ref 7–23)
CALCIUM SERPL-MCNC: 9.4 MG/DL — SIGNIFICANT CHANGE UP (ref 8.5–10.1)
CHLORIDE SERPL-SCNC: 107 MMOL/L — SIGNIFICANT CHANGE UP (ref 96–108)
CO2 SERPL-SCNC: 28 MMOL/L — SIGNIFICANT CHANGE UP (ref 22–31)
CREAT SERPL-MCNC: 0.79 MG/DL — SIGNIFICANT CHANGE UP (ref 0.5–1.3)
GLUCOSE SERPL-MCNC: 65 MG/DL — LOW (ref 70–99)
HCG SERPL-ACNC: <1 MIU/ML — SIGNIFICANT CHANGE UP
HCT VFR BLD CALC: 33 % — LOW (ref 34.5–45)
HGB BLD-MCNC: 10.2 G/DL — LOW (ref 11.5–15.5)
MCHC RBC-ENTMCNC: 24.8 PG — LOW (ref 27–34)
MCHC RBC-ENTMCNC: 30.9 GM/DL — LOW (ref 32–36)
MCV RBC AUTO: 80.1 FL — SIGNIFICANT CHANGE UP (ref 80–100)
NRBC # BLD: 0 /100 WBCS — SIGNIFICANT CHANGE UP (ref 0–0)
PLATELET # BLD AUTO: 259 K/UL — SIGNIFICANT CHANGE UP (ref 150–400)
POTASSIUM SERPL-MCNC: 4.3 MMOL/L — SIGNIFICANT CHANGE UP (ref 3.5–5.3)
POTASSIUM SERPL-SCNC: 4.3 MMOL/L — SIGNIFICANT CHANGE UP (ref 3.5–5.3)
RBC # BLD: 4.12 M/UL — SIGNIFICANT CHANGE UP (ref 3.8–5.2)
RBC # FLD: 14.5 % — SIGNIFICANT CHANGE UP (ref 10.3–14.5)
SODIUM SERPL-SCNC: 141 MMOL/L — SIGNIFICANT CHANGE UP (ref 135–145)
WBC # BLD: 5.59 K/UL — SIGNIFICANT CHANGE UP (ref 3.8–10.5)
WBC # FLD AUTO: 5.59 K/UL — SIGNIFICANT CHANGE UP (ref 3.8–10.5)

## 2020-03-13 PROCEDURE — 84702 CHORIONIC GONADOTROPIN TEST: CPT

## 2020-03-13 PROCEDURE — 36415 COLL VENOUS BLD VENIPUNCTURE: CPT

## 2020-03-13 PROCEDURE — 80048 BASIC METABOLIC PNL TOTAL CA: CPT

## 2020-03-13 PROCEDURE — G0463: CPT

## 2020-03-13 PROCEDURE — 85027 COMPLETE CBC AUTOMATED: CPT

## 2020-03-13 NOTE — H&P PST ADULT - NSANTHOSAYNRD_GEN_A_CORE
No. ABBY screening performed.  STOP BANG Legend: 0-2 = LOW Risk; 3-4 = INTERMEDIATE Risk; 5-8 = HIGH Risk

## 2020-03-13 NOTE — H&P PST ADULT - NSICDXPASTSURGICALHX_GEN_ALL_CORE_FT
PAST SURGICAL HISTORY:  H/O  section     S/P ORIF (open reduction internal fixation) fracture Right arm with tendon repair 2020 @ [facility] after a dog attack PAST SURGICAL HISTORY:  H/O  section     S/P ORIF (open reduction internal fixation) fracture Right arm with tendon repair 2020 @ [facility] after a dog attack

## 2020-03-13 NOTE — H&P PST ADULT - ASSESSMENT
29 yo female with right arm pain scheduled for a ulnar nerve decompression at the right elbow w/ anterior transposition - nerve wrap - ulnar nerve release at the wrist - cortisone injection for hypertrophic scarring of the elbow on 3/23/2020 with Charly.

## 2020-03-13 NOTE — H&P PST ADULT - NSICDXPASTMEDICALHX_GEN_ALL_CORE_FT
PAST MEDICAL HISTORY:  Pain in right elbow PAST MEDICAL HISTORY:  Pain in right elbow     PTSD (post-traumatic stress disorder)

## 2020-03-13 NOTE — H&P PST ADULT - HISTORY OF PRESENT ILLNESS
30 y/o Female pt c/o severe R arm pain s/p dog attack 7 months ago. Pt states pain radiates down arm her R arm and has R arm swelling x8 weeks. Pt reports associated numbing of the fingers on R hand. denies fever. denies HA or neck pain. no chest pain or sob. no abd pain. no n/v/d. no urinary f/u/d. no back pain. no motor deficits. denies illicit drug use. no recent travel. no rash. no other acute issues symptoms or concerns 31 yo right hand dominant female presents to PST scheduled for a ulnar nerve decompression at the right elbow w/ anterior transposition - nerve wrap - ulnar nerve release at the wrist - cortisone injection for hypertrophic scarring of the elbow on 3/23/2020 with Charly. Reports H/O a traumatic pit bull dog attack, sustaining multiple injuries to her right arm, including a fracture. S/P ORIF with nerve and laceration repair in 6/29/2019. Today she c/o severe right arm pain with numbness, that radiates up and down the arm, with drop wrist. Reports PTSD secondary to the attack.

## 2020-03-13 NOTE — H&P PST ADULT - NSICDXPROBLEM_GEN_ALL_CORE_FT
PROBLEM DIAGNOSES  Problem: Pain in right elbow  Assessment and Plan: scheduled for a ulnar nerve decompression at the right elbow w/ anterior transposition - nerve wrap - ulnar nerve release at the wrist - cortisone injection for hypertrophic scarring of the elbow on 3/23/2020 with Charly.    Problem: Pre-op evaluation  Assessment and Plan: Labs - CBC, BMP, and HCG  MC with Dr. Sampson  Pre op and [facility] instructions reviewed and given. Take routine am meds DOS with sip of water. Instructed to avoid NSAIDs and OTC supplements. Verbalized understanding

## 2020-03-16 DIAGNOSIS — M25.521 PAIN IN RIGHT ELBOW: ICD-10-CM

## 2020-03-16 DIAGNOSIS — Z01.818 ENCOUNTER FOR OTHER PREPROCEDURAL EXAMINATION: ICD-10-CM

## 2020-03-20 NOTE — ASU PATIENT PROFILE, ADULT - REASON FOR ADMISSION, PROFILE
ulnar nerve decompression at right elbow/ anterior transposition nerve wrap ulnar nerve release at the wrist

## 2020-03-20 NOTE — ASU PATIENT PROFILE, ADULT - PSH
H/O  section    S/P ORIF (open reduction internal fixation) fracture  Right arm with tendon repair 2020 @ [facility] after a dog attack

## 2020-03-22 ENCOUNTER — TRANSCRIPTION ENCOUNTER (OUTPATIENT)
Age: 30
End: 2020-03-22

## 2020-03-23 ENCOUNTER — OUTPATIENT (OUTPATIENT)
Dept: OUTPATIENT SERVICES | Facility: HOSPITAL | Age: 30
LOS: 1 days | End: 2020-03-23
Payer: MEDICAID

## 2020-03-23 VITALS
WEIGHT: 108.91 LBS | DIASTOLIC BLOOD PRESSURE: 84 MMHG | RESPIRATION RATE: 14 BRPM | SYSTOLIC BLOOD PRESSURE: 122 MMHG | OXYGEN SATURATION: 100 % | HEART RATE: 93 BPM | HEIGHT: 57 IN | TEMPERATURE: 99 F

## 2020-03-23 VITALS
DIASTOLIC BLOOD PRESSURE: 78 MMHG | SYSTOLIC BLOOD PRESSURE: 122 MMHG | RESPIRATION RATE: 15 BRPM | HEART RATE: 97 BPM | OXYGEN SATURATION: 99 %

## 2020-03-23 DIAGNOSIS — M25.521 PAIN IN RIGHT ELBOW: ICD-10-CM

## 2020-03-23 DIAGNOSIS — Z98.891 HISTORY OF UTERINE SCAR FROM PREVIOUS SURGERY: Chronic | ICD-10-CM

## 2020-03-23 DIAGNOSIS — Z01.818 ENCOUNTER FOR OTHER PREPROCEDURAL EXAMINATION: ICD-10-CM

## 2020-03-23 DIAGNOSIS — Z98.890 OTHER SPECIFIED POSTPROCEDURAL STATES: Chronic | ICD-10-CM

## 2020-03-23 LAB — HCG UR QL: NEGATIVE — SIGNIFICANT CHANGE UP

## 2020-03-23 PROCEDURE — 64718 REVISE ULNAR NERVE AT ELBOW: CPT | Mod: RT

## 2020-03-23 PROCEDURE — C1763: CPT

## 2020-03-23 PROCEDURE — 88304 TISSUE EXAM BY PATHOLOGIST: CPT

## 2020-03-23 PROCEDURE — 81025 URINE PREGNANCY TEST: CPT

## 2020-03-23 PROCEDURE — 88304 TISSUE EXAM BY PATHOLOGIST: CPT | Mod: 26

## 2020-03-23 PROCEDURE — 14020 TIS TRNFR S/A/L 10 SQ CM/<: CPT

## 2020-03-23 PROCEDURE — 64727 INTERNAL NEUROLYSIS: CPT

## 2020-03-23 PROCEDURE — 64719 REVISE ULNAR NERVE AT WRIST: CPT | Mod: RT

## 2020-03-23 RX ORDER — HYDROMORPHONE HYDROCHLORIDE 2 MG/ML
0.5 INJECTION INTRAMUSCULAR; INTRAVENOUS; SUBCUTANEOUS
Refills: 0 | Status: DISCONTINUED | OUTPATIENT
Start: 2020-03-23 | End: 2020-03-24

## 2020-03-23 RX ORDER — ONDANSETRON 8 MG/1
4 TABLET, FILM COATED ORAL ONCE
Refills: 0 | Status: DISCONTINUED | OUTPATIENT
Start: 2020-03-23 | End: 2020-03-24

## 2020-03-23 RX ORDER — SODIUM CHLORIDE 9 MG/ML
1000 INJECTION, SOLUTION INTRAVENOUS
Refills: 0 | Status: DISCONTINUED | OUTPATIENT
Start: 2020-03-23 | End: 2020-03-24

## 2020-03-23 RX ORDER — CEFAZOLIN SODIUM 1 G
2000 VIAL (EA) INJECTION ONCE
Refills: 0 | Status: COMPLETED | OUTPATIENT
Start: 2020-03-23 | End: 2020-03-23

## 2020-03-23 RX ORDER — SERTRALINE 25 MG/1
1 TABLET, FILM COATED ORAL
Qty: 0 | Refills: 0 | DISCHARGE

## 2020-03-23 RX ORDER — OXYCODONE HYDROCHLORIDE 5 MG/1
5 TABLET ORAL ONCE
Refills: 0 | Status: DISCONTINUED | OUTPATIENT
Start: 2020-03-23 | End: 2020-03-24

## 2020-03-23 RX ORDER — SODIUM CHLORIDE 9 MG/ML
1000 INJECTION, SOLUTION INTRAVENOUS
Refills: 0 | Status: DISCONTINUED | OUTPATIENT
Start: 2020-03-23 | End: 2020-03-23

## 2020-03-23 RX ADMIN — SODIUM CHLORIDE 100 MILLILITER(S): 9 INJECTION, SOLUTION INTRAVENOUS at 14:00

## 2020-03-23 RX ADMIN — SODIUM CHLORIDE 40 MILLILITER(S): 9 INJECTION, SOLUTION INTRAVENOUS at 09:22

## 2020-03-23 NOTE — ASU DISCHARGE PLAN (ADULT/PEDIATRIC) - CALL YOUR DOCTOR IF YOU HAVE ANY OF THE FOLLOWING:
Fever greater than (need to indicate Fahrenheit or Celsius)/Swelling that gets worse/Pain not relieved by Medications/Wound/Surgical Site with redness, or foul smelling discharge or pus/Bleeding that does not stop

## 2020-03-23 NOTE — ASU PREOP CHECKLIST - NSBLOODTRANS_GEN_A_CORE_SIUH
7/24/19 Patient: Melchor Morales YOB: 1956 [facility]: [facility] [address] [id number] [place] [address] VIA In Basket Dear Diego Vann MD, Thank you for referring Ms. Lele Santacruz to [facility] for evaluation. My notes for this consultation are attached. If you have questions, please do not hesitate to call me. I look forward to following your patient along with you.  
 
 
Sincerely, 
 
Guillermo Grey MD 
 
 no...

## 2020-03-23 NOTE — BRIEF OPERATIVE NOTE - OPERATION/FINDINGS
Right ulnar nerve release at the elbow with anterior subcutaneous transposition and nerve wrap, intraneural neurolysis, excision of right upper arm hypertrophic scar 3x0.5cm, right upper arm scar release with z plasty 2x2cm, Ulnar nerve release at the wrist.

## 2021-10-20 NOTE — OCCUPATIONAL THERAPY INITIAL EVALUATION ADULT - PERSONAL SAFETY AND JUDGMENT, REHAB EVAL
Monitor: The patient's morbid obesity is unchanged.  Evaluation: Diagnostic tests ordered, see full progress note.  Assessment/Treatment:  Discussed the patient's BMI.  The BMI is above average. The patient received dietary education because they have an above normal BMI..  General weight loss/lifestyle modification strategies discussed (elicit support from others; identify saboteurs; non-food rewards, etc).  Condition will be reassessed in 3 months  
May add low dose hydrochlorothiazide   Need follow up Home data to discuss?  Low Salt in Diet   
Monitor: The patient's diabetes is unchanged.  Evaluation: No diagnostic tests required today.  Assessment/Treatment:  Continue current treatment/monitoring regimen.  Continue current treatment regimen.  Condition will be reassessed in 3 months   A1c data 6.1% last year  
intact

## 2022-10-11 NOTE — OCCUPATIONAL THERAPY INITIAL EVALUATION ADULT - PLANNED THERAPY INTERVENTIONS, OT EVAL
Take medication as prescribed.   Use cold compress for comfort.  Follow up with PCP if symptoms do not improve.  Return to ER with new or worsening symptoms.   
ADL retraining

## 2023-06-05 ENCOUNTER — RESULT REVIEW (OUTPATIENT)
Age: 33
End: 2023-06-05

## 2023-06-24 NOTE — ASU DISCHARGE PLAN (ADULT/PEDIATRIC) - CARE PROVIDER_API CALL
Chintan Parks)  Plastic Surgery; Surgery of the Hand  [address] [place] [address], [address]  [place] [address]  Phone: [phone]  Fax: [phone]  Follow Up Time: No abnormal movements

## 2024-03-14 NOTE — H&P PST ADULT - WEIGHT IN KG
no lesions, no deformities, no traumatic injuries, no significant scars are present, chest wall non-tender, no masses present, breathing is unlabored without accessory muscle use,normal breath sounds 49.4

## 2025-05-08 NOTE — H&P PST ADULT - NSANTHTOTALSCORECAL_ENT_A_CORE
Sandra Durantilton  2025    YOB: 0